# Patient Record
Sex: FEMALE | Race: WHITE | NOT HISPANIC OR LATINO | Employment: OTHER | URBAN - METROPOLITAN AREA
[De-identification: names, ages, dates, MRNs, and addresses within clinical notes are randomized per-mention and may not be internally consistent; named-entity substitution may affect disease eponyms.]

---

## 2017-05-22 ENCOUNTER — HOSPITAL ENCOUNTER (OUTPATIENT)
Dept: PHYSICAL THERAPY | Facility: HOSPITAL | Age: 62
Setting detail: RECURRING SERIES
Discharge: HOME | End: 2017-07-25
Attending: PAIN MEDICINE | Admitting: PAIN MEDICINE

## 2017-12-08 ENCOUNTER — HOSPITAL ENCOUNTER (OUTPATIENT)
Dept: MAMMOGRAPHY | Facility: HOSPITAL | Age: 62
Discharge: HOME | End: 2017-12-08
Attending: SURGERY | Admitting: SURGERY

## 2018-01-03 ENCOUNTER — OFFICE (AMBULATORY)
Dept: URBAN - METROPOLITAN AREA CLINIC 64 | Facility: CLINIC | Age: 63
End: 2018-01-03

## 2018-01-03 VITALS
WEIGHT: 164 LBS | HEART RATE: 74 BPM | DIASTOLIC BLOOD PRESSURE: 82 MMHG | SYSTOLIC BLOOD PRESSURE: 129 MMHG | HEIGHT: 66 IN

## 2018-01-03 DIAGNOSIS — K21.9 GASTRO-ESOPHAGEAL REFLUX DISEASE WITHOUT ESOPHAGITIS: ICD-10-CM

## 2018-01-03 PROCEDURE — 99212 OFFICE O/P EST SF 10 MIN: CPT | Performed by: INTERNAL MEDICINE

## 2018-01-03 RX ORDER — DEXLANSOPRAZOLE 60 MG/1
60 CAPSULE, DELAYED RELEASE ORAL
Qty: 90 | Refills: 3 | Status: COMPLETED
Start: 2017-11-06 | End: 2019-07-26

## 2018-05-02 ENCOUNTER — HOSPITAL ENCOUNTER (OUTPATIENT)
Dept: PHYSICAL THERAPY | Facility: HOSPITAL | Age: 63
Setting detail: RECURRING SERIES
Discharge: HOME | End: 2018-07-02
Attending: PAIN MEDICINE | Admitting: PAIN MEDICINE

## 2018-06-27 ENCOUNTER — CONVERSION ENCOUNTER (OUTPATIENT)
Dept: ORTHOPEDIC SURGERY | Facility: CLINIC | Age: 63
End: 2018-06-27

## 2018-08-01 ENCOUNTER — CONVERSION ENCOUNTER (OUTPATIENT)
Dept: ORTHOPEDIC SURGERY | Facility: CLINIC | Age: 63
End: 2018-08-01

## 2018-08-31 ENCOUNTER — HOSPITAL ENCOUNTER (OUTPATIENT)
Dept: FAMILY MEDICINE CLINIC | Facility: CLINIC | Age: 63
Setting detail: SPECIMEN
Discharge: HOME | End: 2018-08-31
Attending: FAMILY MEDICINE | Admitting: FAMILY MEDICINE

## 2018-08-31 ENCOUNTER — CONVERSION ENCOUNTER (OUTPATIENT)
Dept: ORTHOPEDIC SURGERY | Facility: CLINIC | Age: 63
End: 2018-08-31

## 2018-08-31 LAB
ALBUMIN SERPL-MCNC: 4.4 G/DL (ref 3.5–4.8)
ALBUMIN/GLOB SERPL: 1.5 {RATIO} (ref 1–1.7)
ALP SERPL-CCNC: 53 IU/L (ref 32–91)
ALT SERPL-CCNC: 23 IU/L (ref 14–54)
ANION GAP SERPL CALC-SCNC: 12.6 MMOL/L (ref 10–20)
AST SERPL-CCNC: 27 IU/L (ref 15–41)
BILIRUB SERPL-MCNC: 0.9 MG/DL (ref 0.3–1.2)
BUN SERPL-MCNC: 16 MG/DL (ref 8–20)
BUN/CREAT SERPL: 22.9 (ref 5.4–26.2)
CALCIUM SERPL-MCNC: 9.3 MG/DL (ref 8.9–10.3)
CHLORIDE SERPL-SCNC: 103 MMOL/L (ref 101–111)
CHOLEST SERPL-MCNC: 183 MG/DL
CHOLEST/HDLC SERPL: 3.2 {RATIO}
CONV CO2: 25 MMOL/L (ref 22–32)
CONV LDL CHOLESTEROL DIRECT: 113 MG/DL (ref 0–100)
CONV TOTAL PROTEIN: 7.3 G/DL (ref 6.1–7.9)
CREAT UR-MCNC: 0.7 MG/DL (ref 0.4–1)
GLOBULIN UR ELPH-MCNC: 2.9 G/DL (ref 2.5–3.8)
GLUCOSE SERPL-MCNC: 75 MG/DL (ref 65–99)
HDLC SERPL-MCNC: 58 MG/DL
LDLC/HDLC SERPL: 2 {RATIO}
LIPID INTERPRETATION: ABNORMAL
POTASSIUM SERPL-SCNC: 3.6 MMOL/L (ref 3.6–5.1)
SODIUM SERPL-SCNC: 137 MMOL/L (ref 136–144)
TRIGL SERPL-MCNC: 57 MG/DL
VLDLC SERPL CALC-MCNC: 11.9 MG/DL

## 2018-09-14 ENCOUNTER — HOSPITAL ENCOUNTER (OUTPATIENT)
Dept: GENERAL RADIOLOGY | Facility: HOSPITAL | Age: 63
Discharge: HOME | End: 2018-09-14
Attending: PAIN MEDICINE | Admitting: PAIN MEDICINE

## 2019-01-09 ENCOUNTER — CONVERSION ENCOUNTER (OUTPATIENT)
Dept: ORTHOPEDIC SURGERY | Facility: CLINIC | Age: 64
End: 2019-01-09

## 2019-01-09 ENCOUNTER — OFFICE (AMBULATORY)
Dept: URBAN - METROPOLITAN AREA CLINIC 64 | Facility: CLINIC | Age: 64
End: 2019-01-09

## 2019-01-09 VITALS
HEART RATE: 69 BPM | HEIGHT: 66 IN | SYSTOLIC BLOOD PRESSURE: 139 MMHG | DIASTOLIC BLOOD PRESSURE: 98 MMHG | WEIGHT: 168 LBS

## 2019-01-09 DIAGNOSIS — K21.9 GASTRO-ESOPHAGEAL REFLUX DISEASE WITHOUT ESOPHAGITIS: ICD-10-CM

## 2019-01-09 PROCEDURE — 99213 OFFICE O/P EST LOW 20 MIN: CPT | Performed by: INTERNAL MEDICINE

## 2019-01-09 RX ORDER — DEXLANSOPRAZOLE 60 MG/1
CAPSULE, DELAYED RELEASE ORAL
Qty: 90 | Refills: 3 | Status: ACTIVE

## 2019-01-18 ENCOUNTER — HOSPITAL ENCOUNTER (OUTPATIENT)
Dept: MRI IMAGING | Facility: HOSPITAL | Age: 64
Discharge: HOME | End: 2019-01-18
Attending: PHYSICIAN ASSISTANT | Admitting: PHYSICIAN ASSISTANT

## 2019-01-25 ENCOUNTER — CONVERSION ENCOUNTER (OUTPATIENT)
Dept: ORTHOPEDIC SURGERY | Facility: CLINIC | Age: 64
End: 2019-01-25

## 2019-02-04 ENCOUNTER — HOSPITAL ENCOUNTER (OUTPATIENT)
Dept: PREADMISSION TESTING | Facility: HOSPITAL | Age: 64
Discharge: HOME | End: 2019-02-04
Attending: ORTHOPAEDIC SURGERY | Admitting: ORTHOPAEDIC SURGERY

## 2019-02-04 LAB
ABO + RH BLD: NORMAL
ANION GAP SERPL CALC-SCNC: 14.8 MMOL/L (ref 10–20)
ARMBAND: NORMAL
BACTERIA SPEC AEROBE CULT: NORMAL
BASOPHILS # BLD AUTO: 0 10*3/UL (ref 0–0.2)
BASOPHILS NFR BLD AUTO: 0 % (ref 0–2)
BILIRUB UR QL STRIP: NEGATIVE MG/DL
BLD COMPONENT TYPE: NORMAL
BLD GP AB SCN SERPL QL: NEGATIVE
BUN SERPL-MCNC: 20 MG/DL (ref 8–20)
BUN/CREAT SERPL: 25 (ref 5.4–26.2)
CALCIUM SERPL-MCNC: 9.2 MG/DL (ref 8.9–10.3)
CASTS URNS QL MICRO: NORMAL /[LPF]
CHLORIDE SERPL-SCNC: 102 MMOL/L (ref 101–111)
COLOR UR: YELLOW
CONV BACTERIA IN URINE MICRO: NEGATIVE
CONV CLARITY OF URINE: CLEAR
CONV CO2: 24 MMOL/L (ref 22–32)
CONV HYALINE CASTS IN URINE MICRO: 0 /[LPF] (ref 0–5)
CONV PROTEIN IN URINE BY AUTOMATED TEST STRIP: NEGATIVE MG/DL
CONV SMALL ROUND CELLS: NORMAL /[HPF]
CONV UROBILINOGEN IN URINE BY AUTOMATED TEST STRIP: 0.2 MG/DL
CREAT UR-MCNC: 0.8 MG/DL (ref 0.4–1)
CROSSMATCH EXPIRATION: NORMAL
CULTURE INDICATED?: NORMAL
DIFFERENTIAL METHOD BLD: (no result)
EOSINOPHIL # BLD AUTO: 0.1 10*3/UL (ref 0–0.3)
EOSINOPHIL # BLD AUTO: 1 % (ref 0–3)
ERYTHROCYTE [DISTWIDTH] IN BLOOD BY AUTOMATED COUNT: 13 % (ref 11.5–14.5)
GLUCOSE SERPL-MCNC: 73 MG/DL (ref 65–99)
GLUCOSE UR QL: NEGATIVE MG/DL
HCT VFR BLD AUTO: 40.9 % (ref 35–49)
HGB BLD-MCNC: 14 G/DL (ref 12–15)
HGB UR QL STRIP: NEGATIVE
KETONES UR QL STRIP: NEGATIVE MG/DL
LEUKOCYTE ESTERASE UR QL STRIP: NEGATIVE
LYMPHOCYTES # BLD AUTO: 4 10*3/UL (ref 0.8–4.8)
LYMPHOCYTES NFR BLD AUTO: 38 % (ref 18–42)
Lab: NORMAL
MCH RBC QN AUTO: 31.9 PG (ref 26–32)
MCHC RBC AUTO-ENTMCNC: 34.2 G/DL (ref 32–36)
MCV RBC AUTO: 93.2 FL (ref 80–94)
MICRO REPORT STATUS: NORMAL
MONOCYTES # BLD AUTO: 0.7 10*3/UL (ref 0.1–1.3)
MONOCYTES NFR BLD AUTO: 6 % (ref 2–11)
NEUTROPHILS # BLD AUTO: 5.7 10*3/UL (ref 2.3–8.6)
NEUTROPHILS NFR BLD AUTO: 55 % (ref 50–75)
NITRITE UR QL STRIP: NEGATIVE
NRBC BLD AUTO-RTO: 0 /100{WBCS}
NRBC/RBC NFR BLD MANUAL: 0 10*3/UL
PH UR STRIP.AUTO: 6 [PH] (ref 4.5–8)
PLATELET # BLD AUTO: 203 10*3/UL (ref 150–450)
PMV BLD AUTO: 9.9 FL (ref 7.4–10.4)
POTASSIUM SERPL-SCNC: 3.8 MMOL/L (ref 3.6–5.1)
RBC # BLD AUTO: 4.39 10*6/UL (ref 4–5.4)
RBC #/AREA URNS HPF: 2 /[HPF] (ref 0–3)
SODIUM SERPL-SCNC: 137 MMOL/L (ref 136–144)
SP GR UR: 1.02 (ref 1–1.03)
SPECIMEN SOURCE: NORMAL
SPERM URNS QL MICRO: NORMAL /[HPF]
SQUAMOUS SPT QL MICRO: 1 /[HPF] (ref 0–5)
UNIDENT CRYS URNS QL MICRO: NORMAL /[HPF]
WBC # BLD AUTO: 10.5 10*3/UL (ref 4.5–11.5)
WBC #/AREA URNS HPF: 1 /[HPF] (ref 0–5)
YEAST SPEC QL WET PREP: NORMAL /[HPF]

## 2019-02-11 ENCOUNTER — HOSPITAL ENCOUNTER (OUTPATIENT)
Dept: PERIOP | Facility: HOSPITAL | Age: 64
Setting detail: HOSPITAL OUTPATIENT SURGERY
Discharge: HOME | End: 2019-02-11
Attending: ORTHOPAEDIC SURGERY | Admitting: ORTHOPAEDIC SURGERY

## 2019-03-01 ENCOUNTER — CONVERSION ENCOUNTER (OUTPATIENT)
Dept: ORTHOPEDIC SURGERY | Facility: CLINIC | Age: 64
End: 2019-03-01

## 2019-03-19 ENCOUNTER — CONVERSION ENCOUNTER (OUTPATIENT)
Dept: ORTHOPEDIC SURGERY | Facility: CLINIC | Age: 64
End: 2019-03-19

## 2019-03-19 ENCOUNTER — HOSPITAL ENCOUNTER (OUTPATIENT)
Dept: ORTHOPEDIC SURGERY | Facility: CLINIC | Age: 64
Discharge: HOME | End: 2019-03-19
Attending: PHYSICIAN ASSISTANT | Admitting: PHYSICIAN ASSISTANT

## 2019-06-04 VITALS
HEART RATE: 67 BPM | SYSTOLIC BLOOD PRESSURE: 134 MMHG | HEART RATE: 78 BPM | HEIGHT: 66 IN | HEIGHT: 66 IN | WEIGHT: 169 LBS | BODY MASS INDEX: 27.16 KG/M2 | BODY MASS INDEX: 27.1 KG/M2 | HEART RATE: 69 BPM | SYSTOLIC BLOOD PRESSURE: 160 MMHG | SYSTOLIC BLOOD PRESSURE: 134 MMHG | SYSTOLIC BLOOD PRESSURE: 149 MMHG | BODY MASS INDEX: 27.64 KG/M2 | OXYGEN SATURATION: 98 % | SYSTOLIC BLOOD PRESSURE: 145 MMHG | DIASTOLIC BLOOD PRESSURE: 90 MMHG | SYSTOLIC BLOOD PRESSURE: 144 MMHG | DIASTOLIC BLOOD PRESSURE: 89 MMHG | SYSTOLIC BLOOD PRESSURE: 131 MMHG | DIASTOLIC BLOOD PRESSURE: 82 MMHG | WEIGHT: 172 LBS | BODY MASS INDEX: 27.16 KG/M2 | WEIGHT: 169 LBS | BODY MASS INDEX: 27.28 KG/M2 | WEIGHT: 168 LBS | WEIGHT: 170 LBS | OXYGEN SATURATION: 99 % | HEART RATE: 64 BPM | WEIGHT: 169 LBS | HEIGHT: 66 IN | HEART RATE: 54 BPM | HEIGHT: 66 IN | HEART RATE: 62 BPM | BODY MASS INDEX: 27.44 KG/M2 | DIASTOLIC BLOOD PRESSURE: 80 MMHG | DIASTOLIC BLOOD PRESSURE: 89 MMHG | BODY MASS INDEX: 27 KG/M2 | WEIGHT: 168.6 LBS | HEIGHT: 66 IN | DIASTOLIC BLOOD PRESSURE: 74 MMHG | HEART RATE: 70 BPM | DIASTOLIC BLOOD PRESSURE: 93 MMHG

## 2019-06-11 ENCOUNTER — HOSPITAL ENCOUNTER (OUTPATIENT)
Dept: ORTHOPEDIC SURGERY | Facility: CLINIC | Age: 64
Discharge: HOME | End: 2019-06-11
Attending: PHYSICIAN ASSISTANT | Admitting: PHYSICIAN ASSISTANT

## 2019-07-13 ENCOUNTER — HOSPITAL ENCOUNTER (OUTPATIENT)
Facility: HOSPITAL | Age: 64
Setting detail: OBSERVATION
Discharge: HOME | End: 2019-07-13
Attending: EMERGENCY MEDICINE | Admitting: INTERNAL MEDICINE

## 2019-07-13 ENCOUNTER — APPOINTMENT (OUTPATIENT)
Dept: NUCLEAR MEDICINE | Facility: HOSPITAL | Age: 64
End: 2019-07-13

## 2019-07-13 ENCOUNTER — APPOINTMENT (OUTPATIENT)
Dept: GENERAL RADIOLOGY | Facility: HOSPITAL | Age: 64
End: 2019-07-13

## 2019-07-13 VITALS
WEIGHT: 167.11 LBS | SYSTOLIC BLOOD PRESSURE: 113 MMHG | TEMPERATURE: 97.4 F | HEART RATE: 51 BPM | HEIGHT: 66 IN | BODY MASS INDEX: 26.86 KG/M2 | OXYGEN SATURATION: 97 % | RESPIRATION RATE: 14 BRPM | DIASTOLIC BLOOD PRESSURE: 68 MMHG

## 2019-07-13 DIAGNOSIS — R07.9 CHEST PAIN, UNSPECIFIED TYPE: Primary | ICD-10-CM

## 2019-07-13 LAB
ALBUMIN SERPL-MCNC: 4.1 G/DL (ref 3.5–4.8)
ALBUMIN/GLOB SERPL: 1.5 G/DL (ref 1–1.7)
ALP SERPL-CCNC: 58 U/L (ref 32–91)
ALT SERPL W P-5'-P-CCNC: 16 U/L (ref 14–54)
ANION GAP SERPL CALCULATED.3IONS-SCNC: 13.5 MMOL/L (ref 5–15)
APTT PPP: 28.1 SECONDS (ref 24–31)
AST SERPL-CCNC: 21 U/L (ref 15–41)
BASOPHILS # BLD AUTO: 0.1 10*3/MM3 (ref 0–0.2)
BASOPHILS NFR BLD AUTO: 1.1 % (ref 0–1.5)
BH CV NUCLEAR PRIOR STUDY: 3
BH CV STRESS BP STAGE 1: NORMAL
BH CV STRESS BP STAGE 2: NORMAL
BH CV STRESS BP STAGE 3: NORMAL
BH CV STRESS DURATION MIN STAGE 1: 3
BH CV STRESS DURATION MIN STAGE 2: 3
BH CV STRESS DURATION MIN STAGE 3: 1
BH CV STRESS DURATION SEC STAGE 1: 0
BH CV STRESS DURATION SEC STAGE 2: 0
BH CV STRESS DURATION SEC STAGE 3: 11
BH CV STRESS GRADE STAGE 1: 10
BH CV STRESS GRADE STAGE 2: 12
BH CV STRESS GRADE STAGE 3: 14
BH CV STRESS HR STAGE 1: 82
BH CV STRESS HR STAGE 2: 127
BH CV STRESS HR STAGE 3: 139
BH CV STRESS METS STAGE 1: 5
BH CV STRESS METS STAGE 2: 7.5
BH CV STRESS METS STAGE 3: 10
BH CV STRESS PROTOCOL 1: NORMAL
BH CV STRESS RECOVERY BP: NORMAL MMHG
BH CV STRESS RECOVERY HR: 69 BPM
BH CV STRESS SPEED STAGE 1: 1.7
BH CV STRESS SPEED STAGE 2: 2.5
BH CV STRESS SPEED STAGE 3: 3.4
BH CV STRESS STAGE 1: 1
BH CV STRESS STAGE 2: 2
BH CV STRESS STAGE 3: 3
BILIRUB SERPL-MCNC: 0.5 MG/DL (ref 0.3–1.2)
BUN BLD-MCNC: 22 MG/DL (ref 8–20)
BUN/CREAT SERPL: 36.7 (ref 5.4–26.2)
CALCIUM SPEC-SCNC: 9.2 MG/DL (ref 8.9–10.3)
CHLORIDE SERPL-SCNC: 110 MMOL/L (ref 101–111)
CO2 SERPL-SCNC: 21 MMOL/L (ref 22–32)
CREAT BLD-MCNC: 0.6 MG/DL (ref 0.4–1)
D DIMER PPP FEU-MCNC: 0.27 MCGFEU/ML (ref 0.17–0.59)
DEPRECATED RDW RBC AUTO: 41.6 FL (ref 37–54)
EOSINOPHIL # BLD AUTO: 0.2 10*3/MM3 (ref 0–0.4)
EOSINOPHIL NFR BLD AUTO: 3.1 % (ref 0.3–6.2)
ERYTHROCYTE [DISTWIDTH] IN BLOOD BY AUTOMATED COUNT: 12.9 % (ref 12.3–15.4)
GFR SERPL CREATININE-BSD FRML MDRD: 101 ML/MIN/1.73
GLOBULIN UR ELPH-MCNC: 2.7 GM/DL (ref 2.5–3.8)
GLUCOSE BLD-MCNC: 99 MG/DL (ref 65–99)
HCT VFR BLD AUTO: 39.7 % (ref 34–46.6)
HGB BLD-MCNC: 13.8 G/DL (ref 12–15.9)
HOLD SPECIMEN: NORMAL
HOLD SPECIMEN: NORMAL
INR PPP: 0.98 (ref 0.9–1.1)
LYMPHOCYTES # BLD AUTO: 3.6 10*3/MM3 (ref 0.7–3.1)
LYMPHOCYTES NFR BLD AUTO: 44.4 % (ref 19.6–45.3)
MAXIMAL PREDICTED HEART RATE: 158 BPM
MCH RBC QN AUTO: 31.7 PG (ref 26.6–33)
MCHC RBC AUTO-ENTMCNC: 34.8 G/DL (ref 31.5–35.7)
MCV RBC AUTO: 91.1 FL (ref 79–97)
MONOCYTES # BLD AUTO: 0.5 10*3/MM3 (ref 0.1–0.9)
MONOCYTES NFR BLD AUTO: 6.2 % (ref 5–12)
NEUTROPHILS # BLD AUTO: 3.6 10*3/MM3 (ref 1.7–7)
NEUTROPHILS NFR BLD AUTO: 45.2 % (ref 42.7–76)
NRBC BLD AUTO-RTO: 0.1 /100 WBC (ref 0–0.2)
PERCENT MAX PREDICTED HR: 87.97 %
PLATELET # BLD AUTO: 200 10*3/MM3 (ref 140–450)
PMV BLD AUTO: 10 FL (ref 6–12)
POTASSIUM BLD-SCNC: 3.5 MMOL/L (ref 3.6–5.1)
PROT SERPL-MCNC: 6.8 G/DL (ref 6.1–7.9)
PROTHROMBIN TIME: 10.1 SECONDS (ref 9.6–11.7)
RBC # BLD AUTO: 4.35 10*6/MM3 (ref 3.77–5.28)
SODIUM BLD-SCNC: 141 MMOL/L (ref 136–144)
STRESS BASELINE BP: NORMAL MMHG
STRESS BASELINE HR: 69 BPM
STRESS PERCENT HR: 103 %
STRESS POST PEAK BP: NORMAL MMHG
STRESS POST PEAK HR: 139 BPM
STRESS TARGET HR: 134 BPM
TROPONIN I SERPL-MCNC: <0.03 NG/ML (ref 0–0.03)
WBC NRBC COR # BLD: 8 10*3/MM3 (ref 3.4–10.8)
WHOLE BLOOD HOLD SPECIMEN: NORMAL
WHOLE BLOOD HOLD SPECIMEN: NORMAL

## 2019-07-13 PROCEDURE — 0 TECHNETIUM SESTAMIBI: Performed by: INTERNAL MEDICINE

## 2019-07-13 PROCEDURE — 85379 FIBRIN DEGRADATION QUANT: CPT | Performed by: EMERGENCY MEDICINE

## 2019-07-13 PROCEDURE — A9500 TC99M SESTAMIBI: HCPCS | Performed by: INTERNAL MEDICINE

## 2019-07-13 PROCEDURE — 78452 HT MUSCLE IMAGE SPECT MULT: CPT | Performed by: INTERNAL MEDICINE

## 2019-07-13 PROCEDURE — G0378 HOSPITAL OBSERVATION PER HR: HCPCS

## 2019-07-13 PROCEDURE — 96375 TX/PRO/DX INJ NEW DRUG ADDON: CPT

## 2019-07-13 PROCEDURE — 99285 EMERGENCY DEPT VISIT HI MDM: CPT

## 2019-07-13 PROCEDURE — 85025 COMPLETE CBC W/AUTO DIFF WBC: CPT | Performed by: EMERGENCY MEDICINE

## 2019-07-13 PROCEDURE — 96374 THER/PROPH/DIAG INJ IV PUSH: CPT

## 2019-07-13 PROCEDURE — 93018 CV STRESS TEST I&R ONLY: CPT | Performed by: INTERNAL MEDICINE

## 2019-07-13 PROCEDURE — 85730 THROMBOPLASTIN TIME PARTIAL: CPT | Performed by: EMERGENCY MEDICINE

## 2019-07-13 PROCEDURE — 80053 COMPREHEN METABOLIC PANEL: CPT | Performed by: EMERGENCY MEDICINE

## 2019-07-13 PROCEDURE — 25010000002 ONDANSETRON PER 1 MG: Performed by: EMERGENCY MEDICINE

## 2019-07-13 PROCEDURE — 78452 HT MUSCLE IMAGE SPECT MULT: CPT

## 2019-07-13 PROCEDURE — 84484 ASSAY OF TROPONIN QUANT: CPT | Performed by: NURSE PRACTITIONER

## 2019-07-13 PROCEDURE — 85610 PROTHROMBIN TIME: CPT | Performed by: EMERGENCY MEDICINE

## 2019-07-13 PROCEDURE — 84484 ASSAY OF TROPONIN QUANT: CPT | Performed by: EMERGENCY MEDICINE

## 2019-07-13 PROCEDURE — 93005 ELECTROCARDIOGRAM TRACING: CPT | Performed by: EMERGENCY MEDICINE

## 2019-07-13 PROCEDURE — 71045 X-RAY EXAM CHEST 1 VIEW: CPT

## 2019-07-13 PROCEDURE — 93017 CV STRESS TEST TRACING ONLY: CPT

## 2019-07-13 PROCEDURE — 99236 HOSP IP/OBS SAME DATE HI 85: CPT | Performed by: INTERNAL MEDICINE

## 2019-07-13 RX ORDER — HYDROCODONE BITARTRATE AND ACETAMINOPHEN 7.5; 325 MG/1; MG/1
1 TABLET ORAL 3 TIMES DAILY
Status: DISCONTINUED | OUTPATIENT
Start: 2019-07-13 | End: 2019-07-13 | Stop reason: HOSPADM

## 2019-07-13 RX ORDER — FAMOTIDINE 20 MG/1
20 TABLET, FILM COATED ORAL DAILY
Status: DISCONTINUED | OUTPATIENT
Start: 2019-07-13 | End: 2019-07-13 | Stop reason: HOSPADM

## 2019-07-13 RX ORDER — ATORVASTATIN CALCIUM 10 MG/1
10 TABLET, FILM COATED ORAL DAILY
COMMUNITY
End: 2019-09-05 | Stop reason: DRUGHIGH

## 2019-07-13 RX ORDER — FAMOTIDINE 20 MG/1
40 TABLET, FILM COATED ORAL 2 TIMES DAILY
COMMUNITY
End: 2020-02-11

## 2019-07-13 RX ORDER — GABAPENTIN 100 MG/1
100 CAPSULE ORAL NIGHTLY
COMMUNITY

## 2019-07-13 RX ORDER — CELECOXIB 200 MG/1
200 CAPSULE ORAL EVERY EVENING
Status: DISCONTINUED | OUTPATIENT
Start: 2019-07-13 | End: 2019-07-13 | Stop reason: HOSPADM

## 2019-07-13 RX ORDER — FAMOTIDINE 20 MG/1
20 TABLET, FILM COATED ORAL 2 TIMES DAILY
COMMUNITY
End: 2019-07-13

## 2019-07-13 RX ORDER — SODIUM CHLORIDE 0.9 % (FLUSH) 0.9 %
3-10 SYRINGE (ML) INJECTION AS NEEDED
Status: DISCONTINUED | OUTPATIENT
Start: 2019-07-13 | End: 2019-07-13 | Stop reason: HOSPADM

## 2019-07-13 RX ORDER — SODIUM CHLORIDE 0.9 % (FLUSH) 0.9 %
10 SYRINGE (ML) INJECTION AS NEEDED
Status: DISCONTINUED | OUTPATIENT
Start: 2019-07-13 | End: 2019-07-13 | Stop reason: HOSPADM

## 2019-07-13 RX ORDER — PANTOPRAZOLE SODIUM 40 MG/10ML
40 INJECTION, POWDER, LYOPHILIZED, FOR SOLUTION INTRAVENOUS ONCE
Status: COMPLETED | OUTPATIENT
Start: 2019-07-13 | End: 2019-07-13

## 2019-07-13 RX ORDER — ACETAMINOPHEN 325 MG/1
650 TABLET ORAL EVERY 4 HOURS PRN
Status: DISCONTINUED | OUTPATIENT
Start: 2019-07-13 | End: 2019-07-13 | Stop reason: HOSPADM

## 2019-07-13 RX ORDER — ONDANSETRON 2 MG/ML
4 INJECTION INTRAMUSCULAR; INTRAVENOUS EVERY 6 HOURS PRN
Status: DISCONTINUED | OUTPATIENT
Start: 2019-07-13 | End: 2019-07-13 | Stop reason: HOSPADM

## 2019-07-13 RX ORDER — SUCRALFATE 1 G/1
1 TABLET ORAL
Status: DISCONTINUED | OUTPATIENT
Start: 2019-07-13 | End: 2019-07-13 | Stop reason: HOSPADM

## 2019-07-13 RX ORDER — SODIUM CHLORIDE 0.9 % (FLUSH) 0.9 %
3 SYRINGE (ML) INJECTION EVERY 12 HOURS SCHEDULED
Status: DISCONTINUED | OUTPATIENT
Start: 2019-07-13 | End: 2019-07-13 | Stop reason: HOSPADM

## 2019-07-13 RX ORDER — CHLORAL HYDRATE 500 MG
1200 CAPSULE ORAL
COMMUNITY

## 2019-07-13 RX ORDER — ONDANSETRON 2 MG/ML
4 INJECTION INTRAMUSCULAR; INTRAVENOUS ONCE
Status: COMPLETED | OUTPATIENT
Start: 2019-07-13 | End: 2019-07-13

## 2019-07-13 RX ORDER — HYDROCODONE BITARTRATE AND ACETAMINOPHEN 7.5; 325 MG/1; MG/1
1 TABLET ORAL 3 TIMES DAILY
COMMUNITY

## 2019-07-13 RX ORDER — PANTOPRAZOLE SODIUM 40 MG/1
40 TABLET, DELAYED RELEASE ORAL
Status: DISCONTINUED | OUTPATIENT
Start: 2019-07-13 | End: 2019-07-13 | Stop reason: HOSPADM

## 2019-07-13 RX ORDER — DEXLANSOPRAZOLE 60 MG/1
60 CAPSULE, DELAYED RELEASE ORAL DAILY
COMMUNITY

## 2019-07-13 RX ORDER — NITROGLYCERIN 0.4 MG/1
0.4 TABLET SUBLINGUAL
Status: DISCONTINUED | OUTPATIENT
Start: 2019-07-13 | End: 2019-07-13 | Stop reason: HOSPADM

## 2019-07-13 RX ORDER — ASPIRIN 81 MG/1
324 TABLET, CHEWABLE ORAL ONCE
Status: COMPLETED | OUTPATIENT
Start: 2019-07-13 | End: 2019-07-13

## 2019-07-13 RX ORDER — GABAPENTIN 100 MG/1
100 CAPSULE ORAL NIGHTLY
Status: DISCONTINUED | OUTPATIENT
Start: 2019-07-13 | End: 2019-07-13 | Stop reason: HOSPADM

## 2019-07-13 RX ORDER — ATORVASTATIN CALCIUM 10 MG/1
10 TABLET, FILM COATED ORAL DAILY
Status: DISCONTINUED | OUTPATIENT
Start: 2019-07-13 | End: 2019-07-13 | Stop reason: HOSPADM

## 2019-07-13 RX ORDER — CELECOXIB 200 MG/1
200 CAPSULE ORAL EVERY EVENING
COMMUNITY
End: 2021-02-26

## 2019-07-13 RX ORDER — SUCRALFATE 1 G/1
1 TABLET ORAL
Qty: 120 TABLET | Refills: 0 | Status: SHIPPED | OUTPATIENT
Start: 2019-07-13 | End: 2019-08-12

## 2019-07-13 RX ADMIN — Medication 3 ML: at 09:10

## 2019-07-13 RX ADMIN — PANTOPRAZOLE SODIUM 40 MG: 40 TABLET, DELAYED RELEASE ORAL at 06:30

## 2019-07-13 RX ADMIN — SUCRALFATE 1 G: 1 TABLET ORAL at 11:27

## 2019-07-13 RX ADMIN — PANTOPRAZOLE SODIUM 40 MG: 40 INJECTION, POWDER, FOR SOLUTION INTRAVENOUS at 02:07

## 2019-07-13 RX ADMIN — HYDROCODONE BITARTRATE AND ACETAMINOPHEN 1 TABLET: 7.5; 325 TABLET ORAL at 15:57

## 2019-07-13 RX ADMIN — ASPIRIN 81 MG 324 MG: 81 TABLET ORAL at 02:09

## 2019-07-13 RX ADMIN — ONDANSETRON 4 MG: 2 INJECTION INTRAMUSCULAR; INTRAVENOUS at 02:08

## 2019-07-13 RX ADMIN — HYDROCODONE BITARTRATE AND ACETAMINOPHEN 1 TABLET: 7.5; 325 TABLET ORAL at 09:11

## 2019-07-13 RX ADMIN — TECHNETIUM TC 99M SESTAMIBI 1 DOSE: 1 INJECTION INTRAVENOUS at 08:10

## 2019-07-13 RX ADMIN — FAMOTIDINE 20 MG: 20 TABLET, FILM COATED ORAL at 09:11

## 2019-07-13 RX ADMIN — TECHNETIUM TC 99M SESTAMIBI 1 DOSE: 1 INJECTION INTRAVENOUS at 09:55

## 2019-07-13 NOTE — ED PROVIDER NOTES
Subjective   64-year-old female complains of 2 weeks of intermittent chest pain, no clear aggravating or alleviating factors.  Patient went to bed feeling well but woke up just prior to arrival with severe substernal chest pain associated with nausea and vomiting, diaphoresis, ringing in her ears.  Majority of the symptoms have resolved and chest pain has improved spontaneously.  She refused nitroglycerin per EMS and paramedics she says it gives her a severe headache.  Patient reports a history of remote cardiac ablation and stress testing 4 years ago which was reportedly negative.    Patient denies any history of hypertension hyperlipidemia or diabetes.    Patient reports her father dropdead from a heart attack at 48 however details are unclear as the patient did not have an autopsy to her knowledge.            Review of Systems   Constitutional: Positive for diaphoresis.   HENT: Positive for rhinorrhea, sinus pressure and tinnitus.    Cardiovascular: Positive for chest pain.   Gastrointestinal: Positive for diarrhea and vomiting.   Neurological: Positive for light-headedness and headaches.   All other systems reviewed and are negative.      Past Medical History:   Diagnosis Date   • GERD (gastroesophageal reflux disease)        No Known Allergies    History reviewed. No pertinent surgical history.    History reviewed. No pertinent family history.    Social History     Socioeconomic History   • Marital status:      Spouse name: Not on file   • Number of children: Not on file   • Years of education: Not on file   • Highest education level: Not on file   Tobacco Use   • Smoking status: Current Every Day Smoker   Substance and Sexual Activity   • Alcohol use: No     Frequency: Never   • Drug use: No           Objective   Physical Exam   Constitutional: She is oriented to person, place, and time. She appears well-developed and well-nourished.   HENT:   Head: Normocephalic and atraumatic.   Tympanic membranes  normal bilaterally   Eyes: EOM are normal. Pupils are equal, round, and reactive to light.   Neck: Normal range of motion. Neck supple.   Cardiovascular: Normal rate, regular rhythm, normal heart sounds and intact distal pulses.   Pulmonary/Chest: Effort normal and breath sounds normal.   Abdominal: Soft. Bowel sounds are normal. She exhibits no distension. There is no tenderness.   Musculoskeletal: Normal range of motion. She exhibits no edema or deformity.   Neurological: She is alert and oriented to person, place, and time. No cranial nerve deficit.   Motor and sensation intact   Skin: Skin is warm and dry. Capillary refill takes less than 2 seconds.   Psychiatric: She has a normal mood and affect. Her behavior is normal.       Procedures           ED Course  ED Course as of Jul 13 0358   Sat Jul 13, 2019   0144 EKG interpretation: Sinus bradycardia, rate 58, no acute ST elevation  [JR]      ED Course User Index  [JR] Jerome Dunham MD                  MDM  Number of Diagnoses or Management Options  Chest pain, unspecified type:   Diagnosis management comments: Feels better, VSS, given severity of symptoms initially and risk factors, will admit.    Results for orders placed or performed during the hospital encounter of 07/13/19  -Comprehensive Metabolic Panel       Result                      Value             Ref Range           Glucose                     99                65 - 99 mg/dL       BUN                         22 (H)            8 - 20 mg/dL        Creatinine                  0.60              0.40 - 1.00 *       Sodium                      141               136 - 144 mm*       Potassium                   3.5 (L)           3.6 - 5.1 mm*       Chloride                    110               101 - 111 mm*       CO2                         21.0 (L)          22.0 - 32.0 *       Calcium                     9.2               8.9 - 10.3 m*       Total Protein               6.8               6.1 - 7.9 g/*        Albumin                     4.10              3.50 - 4.80 *       ALT (SGPT)                  16                14 - 54 U/L         AST (SGOT)                  21                15 - 41 U/L         Alkaline Phosphatase        58                32 - 91 U/L         Total Bilirubin             0.5               0.3 - 1.2 mg*       eGFR Non African Amer       101               >60 mL/min/1*       Globulin                    2.7               2.5 - 3.8 gm*       A/G Ratio                   1.5               1.0 - 1.7 g/*       BUN/Creatinine Ratio        36.7 (H)          5.4 - 26.2          Anion Gap                   13.5              5.0 - 15.0 m*  -Protime-INR       Result                      Value             Ref Range           Protime                     10.1              9.6 - 11.7 S*       INR                         0.98              0.90 - 1.10    -aPTT       Result                      Value             Ref Range           PTT                         28.1              24.0 - 31.0 *  -D-dimer, Quantitative       Result                      Value             Ref Range           D-Dimer, Quantitative       0.27              0.17 - 0.59 *  -Troponin       Result                      Value             Ref Range           Troponin I                  <0.030            0.000 - 0.03*  -CBC Auto Differential       Result                      Value             Ref Range           WBC                         8.00              3.40 - 10.80*       RBC                         4.35              3.77 - 5.28 *       Hemoglobin                  13.8              12.0 - 15.9 *       Hematocrit                  39.7              34.0 - 46.6 %       MCV                         91.1              79.0 - 97.0 *       MCH                         31.7              26.6 - 33.0 *       MCHC                        34.8              31.5 - 35.7 *       RDW                         12.9              12.3 - 15.4 %       RDW-SD                       41.6              37.0 - 54.0 *       MPV                         10.0              6.0 - 12.0 fL       Platelets                   200               140 - 450 10*       Neutrophil %                45.2              42.7 - 76.0 %       Lymphocyte %                44.4              19.6 - 45.3 %       Monocyte %                  6.2               5.0 - 12.0 %        Eosinophil %                3.1               0.3 - 6.2 %         Basophil %                  1.1               0.0 - 1.5 %         Neutrophils, Absolute       3.60              1.70 - 7.00 *       Lymphocytes, Absolute       3.60 (H)          0.70 - 3.10 *       Monocytes, Absolute         0.50              0.10 - 0.90 *       Eosinophils, Absolute       0.20              0.00 - 0.40 *       Basophils, Absolute         0.10              0.00 - 0.20 *       nRBC                        0.1               0.0 - 0.2 /1*  -Light Blue Top       Result                      Value             Ref Range           Extra Tube                                                    hold for add-on  -Green Top (Gel)       Result                      Value             Ref Range           Extra Tube                                                    Hold for add-ons.  -Lavender Top       Result                      Value             Ref Range           Extra Tube                                                    hold for add-on  -Gold Top - SST       Result                      Value             Ref Range           Extra Tube                                                    Hold for add-ons.         Amount and/or Complexity of Data Reviewed  Independent visualization of images, tracings, or specimens: yes          Final diagnoses:   Chest pain, unspecified type            Jerome Dunham MD  07/13/19 0358

## 2019-07-13 NOTE — H&P
CC: Chest pain    HPI: Patient is a 65 yo F with medical history of GERD, HLD, and chronic hip pain and neuropathy. She reports that over the last month she has experienced intermittent chest pain that has been increasing in severity. She states that last night, she awoke to shrill ringing in her ears, dizziness, diaphoresis, and nausea with vomiting. Due to a previous ablation the patient was concerned and called an ambulance. In the ER, troponin was normal and other labs were unremarkable. A stress test the next morning was negative, and EKG showed sinus bradycardia. Patient states that she is very active and lives on her own.    Today, the patient feels she is back to baseline and has no complaints other than hip discomfort due to hospital bed. She is eating and drinking, and reports that her chest pain was relieved some with carafate. She still complains of some chest pain with palpation. Otherwise the patient has no additional complaints and denies nausea, vomiting, or headache.    Medical History  Hyperlipidemia  GERD  Arthritis    Surgical History  Cholecystectomy  Appendectomy    Family History  Mother - Cancer  Father - Heart disease  Brother - Cancer    Social History  Current every day smoker (0.25 ppd)  Denies history of alcohol use  Denies history of substance use    Allergies  No known allergies    Hospital Medications  acetaminophen (TYLENOL) tablet 650 mg   aspirin chewable tablet 324 mg (Completed)   atorvastatin (LIPITOR) tablet 10 mg   celecoxib (CeleBREX) capsule 200 mg   famotidine (PEPCID) tablet 20 mg   gabapentin (NEURONTIN) capsule 100 mg   HYDROcodone-acetaminophen (NORCO) 7.5-325 MG per tablet 1 tablet   nitroglycerin (NITROSTAT) SL tablet 0.4 mg   ondansetron (ZOFRAN) injection 4 mg (Completed)   ondansetron (ZOFRAN) injection 4 mg   pantoprazole (PROTONIX) EC tablet 40 mg   pantoprazole (PROTONIX) injection 40 mg (Completed)   sodium chloride 0.9 % flush 10 mL   sodium chloride 0.9 %  flush 3 mL   sodium chloride 0.9 % flush 3-10 mL   sucralfate (CARAFATE) tablet 1 g   technetium sestamibi (CARDIOLITE) injection 1 dose (Completed)   technetium sestamibi (CARDIOLITE) injection 1 dose (Completed)     Outpatient Medications  atorvastatin (LIPITOR) 10 MG tablet   celecoxib (CeleBREX) 200 MG capsule   dexlansoprazole (DEXILANT) 60 MG capsule   diclofenac (FLECTOR) 1.3 % patch patch   famotidine (PEPCID) 20 MG tablet   gabapentin (NEURONTIN) 100 MG capsule   HYDROcodone-acetaminophen (NORCO) 7.5-325 MG per tablet   Omega-3 Fatty Acids (FISH OIL) 1000 MG capsule capsule     Review of Systems  Constitutional: Patient denies weakness, fatigue  Cardiovascular: Patient denies palpitations. Confirms chest pain.  Pulmonary: Patient denies cough, shortness of air, wheeze  Abdominal: Patient denies abdominal pain, nausea, vomiting, diarrhea  Neurological: Patient denies dizziness, headache    Physical Exam  General: Patient is A&O x4, cooperative, in no acute distress  HEENT: EOMI, PERRLA  Cardiovascular: RRR, no murmurs, rubs, gallups  Pulmonary: CTA bilaterally, no wheezes, rales, or rhonchi  Peripheral vascular: No peripheral edema  Skin: Warm, dry, intact. No diaphoresis.    Recent Labs  Troponin   Order: [id number]   Status:  Final result   Visible to patient:  No (Not Released)    Ref Range & Units 12:47   Troponin I 0.000 - 0.030 ng/mL <0.030    Resulting [facility]           D-dimer, Quantitative   Order: [id number]   Status:  Final result   Visible to patient:  No (Not Released)    Ref Range & Units 01:49   D-Dimer, Quantitative 0.17 - 0.59 MCGFEU/mL 0.27    Resulting Agency   [facility]             Comprehensive Metabolic Panel   Order: [id number]   Status:  Final result   Visible to patient:  No (Not Released)    Ref Range & Units 01:49   Glucose 65 - 99 mg/dL 99    BUN 8 - 20 mg/dL 22 Abnormally high     Creatinine 0.40 - 1.00 mg/dL 0.60    Sodium 136 - 144 mmol/L 141    Potassium 3.6 - 5.1  mmol/L 3.5 Abnormally low     Chloride 101 - 111 mmol/L 110    CO2 22.0 - 32.0 mmol/L 21.0 Abnormally low     Calcium 8.9 - 10.3 mg/dL 9.2    Total Protein 6.1 - 7.9 g/dL 6.8    Albumin 3.50 - 4.80 g/dL 4.10    ALT (SGPT) 14 - 54 U/L 16    AST (SGOT) 15 - 41 U/L 21    Alkaline Phosphatase 32 - 91 U/L 58    Total Bilirubin 0.3 - 1.2 mg/dL 0.5    eGFR Non African Amer >60 mL/min/1.73 101    Globulin 2.5 - 3.8 gm/dL 2.7    A/G Ratio 1.0 - 1.7 g/dL 1.5    BUN/Creatinine Ratio 5.4 - 26.2 36.7 Abnormally high     Anion Gap 5.0 - 15.0 mmol/L 13.5            CBC Auto Differential   Order: [id number] - Part of Panel Order [id number]   Status:  Final result   Visible to patient:  No (Not Released)    Ref Range & Units 01:49   WBC 3.40 - 10.80 10*3/mm3 8.00    RBC 3.77 - 5.28 10*6/mm3 4.35    Hemoglobin 12.0 - 15.9 g/dL 13.8    Hematocrit 34.0 - 46.6 % 39.7    MCV 79.0 - 97.0 fL 91.1    MCH 26.6 - 33.0 pg 31.7    MCHC 31.5 - 35.7 g/dL 34.8    RDW 12.3 - 15.4 % 12.9    RDW-SD 37.0 - 54.0 fl 41.6    MPV 6.0 - 12.0 fL 10.0    Platelets 140 - 450 10*3/mm3 200    Neutrophil % 42.7 - 76.0 % 45.2    Lymphocyte % 19.6 - 45.3 % 44.4    Monocyte % 5.0 - 12.0 % 6.2    Eosinophil % 0.3 - 6.2 % 3.1    Basophil % 0.0 - 1.5 % 1.1    Neutrophils, Absolute 1.70 - 7.00 10*3/mm3 3.60    Lymphocytes, Absolute 0.70 - 3.10 10*3/mm3 3.60 Abnormally high     Monocytes, Absolute 0.10 - 0.90 10*3/mm3 0.50    Eosinophils, Absolute 0.00 - 0.40 10*3/mm3 0.20    Basophils, Absolute 0.00 - 0.20 10*3/mm3 0.10    nRBC 0.0 - 0.2 /100 WBC 0.1            Stress Test    Nuclear Study Description A 1-day rest/stress protocol myocardial perfusion imaging study was performed. While at rest, the patient was injected intravenously with 6.8 mCi of technetium sestamibi at 08:10 EDT. While at peak stress, the patient was injected intravenously with 19.1 mCi of technetium sestamibi at 09:55 EDT. The total amount of radiation received in the study is about 7.85 mSv.No prior  studies were available for comparison   Nuclear Perfusion Images Overall image quality is good. Diaphragmatic attenuation and GI artifacts are present. Raw images reviewed with no abnormalities noted.   Imaging Contrast/Medications:      technetium sestamibi (CARDIOLITE) injection 1 dose   Given: 1 dose Intravenous    technetium sestamibi (CARDIOLITE) injection 1 dose   Given: 1 dose Intravenous   Stress Procedure     Rest ECG Baseline ECG of normal sinus rhythm noted. Normal baseline ECG noted at rest.   There was no ST segment deviation noted.   Stress Description A stress test was performed following the Jose Alfredo protocol.   The patient reached the end of the protocol and achieved the target heart rate. The test was stopped because the patient complained of shortness of breath.  The patient reported no symptoms during the stress test. The patient experienced no angina during the stress test.   Low risk for ischemic heart disease.   Blood pressure demonstrated a normal response to stress. Heart rate demonstrated a normal response to stress. Overall, the patient's exercise capacity was normal.   Stress ECG Stress ECG rhythm of sinus tachycardia noted. PVCs noted.   There was no ST segment deviation noted during stress.   Arrhythmias during stress: rare PVCs.   Arrhythmias were not significant.   Stress ECG was interpretable and indicates a normal stress ECG.   Normal ECG stress ECG interpretation.   Recovery ECG During recovery, the patient complained of no significant symptoms following stress.  Sinus rhythm was noted during recovery. Normal ECG with no significant recovery phase changes noted. There was no ST segment deviation noted during recovery.   Arrhythmias during recovery: rare PVCs. No significant arrhythmias were noted during the recovery stage.   Stress Findings No ECG evidence of myocardial ischemia.Negative clinical evidence of myocardial ischemia. Findings consistent with a normal ECG stress test. ECG  NSR without ST T wave Abn seen, stres ECG with J point depression and upsloping ST segments and ST80 was normal. Target met, good predictability, Low risk study for ischemia. .   Nuclear Perfusion Findings     Study Impression Myocardial perfusion imaging indicates a normal myocardial perfusion study with no evidence of ischemia. Impressions are consistent with a low risk study. There is no prior study available for comparison. Normal resting perfusion seen. Stress with small apical defect with normal wall motion. EF 67%, SSS0. SRS0. SDS0. .   Rest Perfusion Defect 1 No rest myocardial perfusion defect noted.   Stress Perfusion Defect 1 There is a small sized defect of mild severity present in the apex wall.   Ventricle Size / Description Left ventricular ejection fraction is normal. Normal LV cavity size. Normal LV wall motion noted. RV cavity size not well visualized        7/13/2019 2:05 AM   XR CHEST 1 VW-     INDICATIONS:   cp     COMPARISON:  2/4/2019     TECHNIQUE:   [Portable chest radiograph]     FINDINGS:   Cardiomediastinal silhouette within normal limits. Mild fullness of the  central pulmonary vasculature without overt pulmonary edema. No  pneumothorax. No large effusion. Osseous structures intact.      IMPRESSION:  Stable chronic findings without acute process.    Assessment and Plan    Chest Pain - patient has long history of smoking which classifies her as high risk for CAD and possible ischemic injury. However, serial troponin remained normal, EKG was unconcerning, and stress test was normal. 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 In addition, the pain was relieved with carafate, which suggests some GI aspect as well. It is likely that this pain is a result of both GERD and muscle injury. Plan to continue carafate and  "consider adjusting PPI dosage. Consider GI consult.    GERD - Patient    Hyperlipidemia - controlled on current atorvastatin dosage    Chronic pain/neuropathy secondary to arthritis - patient has long term narcotic use due to chronic pain. Pain is currently controlled on hydrocodone and gabapentin. Continue current regimen. Consider OT.    Smoker- Patient requires smoking cessation counseling. States \"she isn't addicted and doesn't need patch\".    "

## 2019-07-13 NOTE — DISCHARGE SUMMARY
Date of Admission: 7/13/2019    Date of Discharge:  7/13/2019    Length of stay:  LOS: 0 days     Presenting Problem/History of Present Illness  Active Hospital Problems    Diagnosis  POA   • Chest pain [R07.9]  Yes      Resolved Hospital Problems   No resolved problems to display.          Hospital Course  Ms. Garvin is a 64 year old  female with a past medical history significant for GERD, HLD, chronic hip pain and neuropathy pain with narcotic dependence, and tobacco abuse.  She reports to the ER [facility] with complaints of left sided chest pain with reflux over the past several months.  She states she thought she pulled a muscle because it is sore to the touch, but over the past week her symptoms have increased.  She states that last night after she got up to take the dog out, she sat back down on her bed and had extreme dizziness, nausea and vomiting, and chest pain.  She denies any dyspnea. She reports a normal stress test 4 years ago.  She reports an EGD about 5 years ago and had polyps in there stomach.      Patient was kept in the hospital was ruled out for MI and she clinically improved and discharged home as a stress test came all negative and she will see GI as an outpatient for upper endoscopy.  Patient discharged in stable condition    Discharge diagnosis    Chest pain        Past Medical History:     Past Medical History:   Diagnosis Date   • Arthritis    • GERD (gastroesophageal reflux disease)    • Hyperlipidemia        Past Surgical History:     Past Surgical History:   Procedure Laterality Date   • APPENDECTOMY     • CHOLECYSTECTOMY         Social History:   Social History     Socioeconomic History   • Marital status:      Spouse name: Not on file   • Number of children: Not on file   • Years of education: Not on file   • Highest education level: Not on file   Tobacco Use   • Smoking status: Current Every Day Smoker     Packs/day: 0.25     Types: Cigarettes   Substance and Sexual  Activity   • Alcohol use: No     Frequency: Never   • Drug use: No   • Sexual activity: Defer       Procedures Performed         Consults:   Consults     Date and Time Order Name Status Description    7/13/2019 0355 Inpatient Hospitalist Consult Completed           Pertinent Test Results:     I reviewed the patient's new clinical results.    Results from last 7 days   Lab Units 07/13/19  0149   WBC 10*3/mm3 8.00   HEMOGLOBIN g/dL 13.8   HEMATOCRIT % 39.7   PLATELETS 10*3/mm3 200     Results from last 7 days   Lab Units 07/13/19  0149   SODIUM mmol/L 141   POTASSIUM mmol/L 3.5*   CHLORIDE mmol/L 110   CO2 mmol/L 21.0*   BUN mg/dL 22*   CREATININE mg/dL 0.60   GLUCOSE mg/dL 99   CALCIUM mg/dL 9.2     Results from last 7 days   Lab Units 07/13/19  0149   SODIUM mmol/L 141   POTASSIUM mmol/L 3.5*   CHLORIDE mmol/L 110   CO2 mmol/L 21.0*   BUN mg/dL 22*   CREATININE mg/dL 0.60   CALCIUM mg/dL 9.2   BILIRUBIN mg/dL 0.5   ALK PHOS U/L 58   ALT (SGPT) U/L 16   AST (SGOT) U/L 21   GLUCOSE mg/dL 99         Lab Results   Component Value Date    CALCIUM 9.2 07/13/2019     No results found for: HGBA1C          Microbiology Results (last 10 days)     ** No results found for the last 240 hours. **          ECG/EMG Results (most recent)     Procedure Component Value Units Date/Time    ECG 12 Lead [[id number]] Collected:  07/13/19 0136     Updated:  07/13/19 0138    Narrative:       HEART RATE= 58  bpm  RR Interval= 1036  ms  UT Interval= 170  ms  P Horizontal Axis= 11  deg  P Front Axis= 18  deg  QRSD Interval= 79  ms  QT Interval= 418  ms  QRS Axis= 27  deg  T Wave Axis= 59  deg  - OTHERWISE NORMAL ECG -  Sinus bradycardia  Low voltage, precordial leads  Electronically Signed By:   Date and Time of Study: 2019-07-13 01:36:16                    Xr Chest 1 View    Result Date: 7/13/2019  Stable chronic findings without acute process.  Electronically Signed By-Compa Chow On:7/13/2019 8:35 AM This report was finalized on  [id number] by  Compa Claudine .      Xrays, labs reviewed personally by physician.      Condition on Discharge:    Stable    Vital Signs  Temp:  [97.4 °F (36.3 °C)-97.8 °F (36.6 °C)] 97.4 °F (36.3 °C)  Heart Rate:  [51-61] 51  Resp:  [12-14] 14  BP: (113-149)/(58-89) 113/68    Physical Exam:  Physical Exam    Discharge Diagnosis:     Chest pain      Discharge Disposition  Home or Self Care       Discharge Medications      New Medications      Instructions Start Date   sucralfate 1 g tablet  Commonly known as:  CARAFATE   1 g, Oral, 4 Times Daily Before Meals & Nightly         Continue These Medications      Instructions Start Date   atorvastatin 10 MG tablet  Commonly known as:  LIPITOR   10 mg, Oral, Daily      celecoxib 200 MG capsule  Commonly known as:  CeleBREX   200 mg, Oral, Every Evening      dexlansoprazole 60 MG capsule  Commonly known as:  DEXILANT   60 mg, Oral, Daily      diclofenac 1.3 % patch patch  Commonly known as:  FLECTOR   1 patch, Topical, 2 Times Daily      famotidine 20 MG tablet  Commonly known as:  PEPCID   20 mg, Oral, Daily      fish oil 1000 MG capsule capsule   1,200 mg, Oral, Every Night at Bedtime      gabapentin 100 MG capsule  Commonly known as:  NEURONTIN   100 mg, Oral, Nightly      HYDROcodone-acetaminophen 7.5-325 MG per tablet  Commonly known as:  NORCO   1 tablet, Oral, 3 Times Daily             Discharge Diet:     Activity at Discharge:     Follow-up Appointments  Future Appointments   Date Time Provider Department Center   8/30/2019  1:30 PM Jennifer Jaffe MD [facility] None         Test Results Pending at Discharge       Risk for Readmission (LACE) Score: 1 (7/13/2019  6:00 AM)          Leland Gonzalez MD  07/13/19  6:18 PM    Time: Greater than 30 minutes in arranging for the discharge.

## 2019-07-13 NOTE — H&P
[facility] HOSPITALIST     Jennifer Jaffe MD    CHIEF COMPLAINT:     Heart burn, dizziness, chest pain    HISTORY OF PRESENT ILLNESS:    Ms. Garvin is a 64 year old  female with a past medical history significant for GERD, HLD, chronic hip pain and neuropathy pain with narcotic dependence, and tobacco abuse.  She reports to the ER [facility] with complaints of left sided chest pain with reflux over the past several months.  She states she thought she pulled a muscle because it is sore to the touch, but over the past week her symptoms have increased.  She states that last night after she got up to take the dog out, she sat back down on her bed and had extreme dizziness, nausea and vomiting, and chest pain.  She denies any dyspnea. She reports a normal stress test 4 years ago.  She reports an EGD about 5 years ago and had polyps in there stomach.      Work up int he ER was unremarkable except for potassium of 3.5.  She will be admitted for serial troponin's and a stress test this morning.     Cardiologist: Santosh  GI: Nelly       Past Medical History:   Diagnosis Date   • Arthritis    • GERD (gastroesophageal reflux disease)    • Hyperlipidemia      Past Surgical History:   Procedure Laterality Date   • APPENDECTOMY     • CHOLECYSTECTOMY       Family History   Problem Relation Age of Onset   • Cancer Mother    • Heart disease Father    • Cancer Brother      Social History     Tobacco Use   • Smoking status: Current Every Day Smoker     Packs/day: 0.25     Types: Cigarettes   Substance Use Topics   • Alcohol use: No     Frequency: Never   • Drug use: No     No medications prior to admission.     Allergies:  Patient has no known allergies.      There is no immunization history on file for this patient.        REVIEW OF SYSTEMS:     Review of Systems   Cardiovascular: Positive for chest pain.   Gastrointestinal: Positive for heartburn.   Neurological: Positive for dizziness.   All  "other systems reviewed and are negative.      Vital Signs  Temp:  [97.4 °F (36.3 °C)-97.8 °F (36.6 °C)] 97.4 °F (36.3 °C)  Heart Rate:  [51-61] 51  Resp:  [12-14] 14  BP: (113-149)/(58-89) 113/68    Flowsheet Rows      First Filed Value   Admission Height  167.6 cm (66\") Documented at 07/13/2019 0133   Admission Weight  77.1 kg (170 lb) Documented at 07/13/2019 0133           Physical Exam:    Physical Exam   Constitutional: She is oriented to person, place, and time. She appears well-developed and well-nourished.   HENT:   Head: Normocephalic and atraumatic.   Eyes: EOM are normal. Pupils are equal, round, and reactive to light.   Neck: Normal range of motion.   Cardiovascular: Normal rate and regular rhythm.   Pulmonary/Chest: Effort normal and breath sounds normal.   Abdominal: Soft. Bowel sounds are normal.   Musculoskeletal: Normal range of motion. She exhibits tenderness.   Left chest tenderness   Neurological: She is alert and oriented to person, place, and time.   Skin: Skin is warm and dry.         Results Review:    I reviewed the patient's new clinical results.  Lab Results (most recent)     Procedure Component Value Units Date/Time    [employer] Draw [[id number]] Collected:  07/13/19 0149    Specimen:  Blood Updated:  07/13/19 0300    Narrative:       The following orders were created for panel order [employer] Draw.  Procedure                               Abnormality         Status                     ---------                               -----------         ------                     Light Blue Top[[id number]]                                   Final result               Green Top (Gel)[[id number]]                                  Final result               Lavender Top[[id number]]                                     Final result               Gold Top - SST[[id number]]                                   Final result                 Please view results for these tests on the individual orders.    Light Blue Top " [[id number]] Collected:  07/13/19 0149    Specimen:  Blood Updated:  07/13/19 0300     Extra Tube hold for add-on     Comment: Auto resulted       Green Top (Gel) [[id number]] Collected:  07/13/19 0149    Specimen:  Blood Updated:  07/13/19 0300     Extra Tube Hold for add-ons.     Comment: Auto resulted.       Lavender Top [[id number]] Collected:  07/13/19 0149    Specimen:  Blood Updated:  07/13/19 0300     Extra Tube hold for add-on     Comment: Auto resulted       Gold Top - SST [[id number]] Collected:  07/13/19 0149    Specimen:  Blood Updated:  07/13/19 0300     Extra Tube Hold for add-ons.     Comment: Auto resulted.       Troponin [[id number]]  (Normal) Collected:  07/13/19 0149    Specimen:  Blood Updated:  07/13/19 0242     Troponin I <0.030 ng/mL     Narrative:       Troponin I Reference Range:    0.00-0.03  Negative.  Repeat testing in 4-6 hours if clinically indicated.    0.04-0.29  Suspicious for myocardial injury. Serial measurements and clinical  correlation may be necessary to confirm or exclude diagnosis of acute  coronary syndrome.  Repeat testing in 4-6 hours if indicated.     >0.29 Consistent with myocardial injury.  Recommend clinical and laboratory correlation.     Results my be falsely decreased if patient taking Biotin.     Comprehensive Metabolic Panel [[id number]]  (Abnormal) Collected:  07/13/19 0149    Specimen:  Blood Updated:  07/13/19 0240     Glucose 99 mg/dL      BUN 22 mg/dL      Creatinine 0.60 mg/dL      Sodium 141 mmol/L      Potassium 3.5 mmol/L      Chloride 110 mmol/L      CO2 21.0 mmol/L      Calcium 9.2 mg/dL      Total Protein 6.8 g/dL      Albumin 4.10 g/dL      ALT (SGPT) 16 U/L      AST (SGOT) 21 U/L      Alkaline Phosphatase 58 U/L      Total Bilirubin 0.5 mg/dL      eGFR Non African Amer 101 mL/min/1.73      Globulin 2.7 gm/dL      A/G Ratio 1.5 g/dL      BUN/Creatinine Ratio 36.7     Anion Gap 13.5 mmol/L     Protime-INR [[id number]]  (Normal) Collected:  07/13/19 0149     Specimen:  Blood Updated:  07/13/19 0233     Protime 10.1 Seconds      INR 0.98    aPTT [[id number]]  (Normal) Collected:  07/13/19 0149    Specimen:  Blood Updated:  07/13/19 0233     PTT 28.1 seconds     D-dimer, Quantitative [[id number]]  (Normal) Collected:  07/13/19 0149    Specimen:  Blood Updated:  07/13/19 0233     D-Dimer, Quantitative 0.27 MCGFEU/mL     Narrative:       Reference Range  --------------------------------------------------------------------     < 0.50   Negative Predictive Value  0.50-0.59   Indeterminate    >= 0.60   Probable VTE             A very low percentage of patients with DVT may yield D-Dimer results   below the cut-off of 0.50 MCGFEU/mL.  This is known to be more   prevalent in patients with distal DVT.             Results of this test should always be interpreted in conjunction with   the patient's medical history, clinical presentation and other   findings.  Clinical diagnosis should not be based on the result of   INNOVANCE D-Dimer alone.    CBC & Differential [[id number]] Collected:  07/13/19 0149    Specimen:  Blood Updated:  07/13/19 0218    Narrative:       The following orders were created for panel order CBC & Differential.  Procedure                               Abnormality         Status                     ---------                               -----------         ------                     CBC Auto Differential[[id number]]        Abnormal            Final result                 Please view results for these tests on the individual orders.    CBC Auto Differential [[id number]]  (Abnormal) Collected:  07/13/19 0149    Specimen:  Blood Updated:  07/13/19 0218     WBC 8.00 10*3/mm3      RBC 4.35 10*6/mm3      Hemoglobin 13.8 g/dL      Hematocrit 39.7 %      MCV 91.1 fL      MCH 31.7 pg      MCHC 34.8 g/dL      RDW 12.9 %      RDW-SD 41.6 fl      MPV 10.0 fL      Platelets 200 10*3/mm3      Neutrophil % 45.2 %      Lymphocyte % 44.4 %      Monocyte % 6.2 %      Eosinophil %  3.1 %      Basophil % 1.1 %      Neutrophils, Absolute 3.60 10*3/mm3      Lymphocytes, Absolute 3.60 10*3/mm3      Monocytes, Absolute 0.50 10*3/mm3      Eosinophils, Absolute 0.20 10*3/mm3      Basophils, Absolute 0.10 10*3/mm3      nRBC 0.1 /100 WBC           Imaging Results (most recent)     Procedure Component Value Units Date/Time    XR Chest 1 View [[id number]] Collected:  07/13/19 0835     Updated:  07/13/19 0837    Narrative:          DATE OF EXAM:   7/13/2019 2:05 AM     PROCEDURE:   XR CHEST 1 VW-     INDICATIONS:   cp     COMPARISON:  2/4/2019     TECHNIQUE:   [Portable chest radiograph]     FINDINGS:   Cardiomediastinal silhouette within normal limits. Mild fullness of the  central pulmonary vasculature without overt pulmonary edema. No  pneumothorax. No large effusion. Osseous structures intact.        Impression:       Stable chronic findings without acute process.     Electronically Signed By-Compa Chow On:7/13/2019 8:35 AM  This report was finalized on [id number] by  Compa Chow, .        reviewed    ECG/EMG Results (most recent)     Procedure Component Value Units Date/Time    ECG 12 Lead [[id number]] Collected:  07/13/19 0136     Updated:  07/13/19 0138    Narrative:       HEART RATE= 58  bpm  RR Interval= 1036  ms  NM Interval= 170  ms  P Horizontal Axis= 11  deg  P Front Axis= 18  deg  QRSD Interval= 79  ms  QT Interval= 418  ms  QRS Axis= 27  deg  T Wave Axis= 59  deg  - OTHERWISE NORMAL ECG -  Sinus bradycardia  Low voltage, precordial leads  Electronically Signed By:   Date and Time of Study: 2019-07-13 01:36:16        reviewed              Stress Test With Myocardial Perfusion One Day  · Diaphragmatic attenuation and GI artifacts are present.  · Left ventricular ejection fraction is normal.  · Myocardial perfusion imaging indicates a normal myocardial perfusion   study with no evidence of ischemia.  · Impressions are consistent with a low risk study.  · There is no prior study available  for comparison. Normal resting   perfusion seen. Stress with small apical defect with normal wall motion.   EF 67%, SSS0. SRS0. SDS0. .  · Low risk for ischemic heart disease.  · Findings consistent with a normal ECG stress test.  · Findings consistent with a normal ECG stress test.     XR Chest 1 View  Narrative:    DATE OF EXAM:   7/13/2019 2:05 AM     PROCEDURE:   XR CHEST 1 VW-     INDICATIONS:   cp     COMPARISON:  2/4/2019     TECHNIQUE:   [Portable chest radiograph]     FINDINGS:   Cardiomediastinal silhouette within normal limits. Mild fullness of the  central pulmonary vasculature without overt pulmonary edema. No  pneumothorax. No large effusion. Osseous structures intact.      Impression: Stable chronic findings without acute process.     Electronically Signed By-Compa Chow On:7/13/2019 8:35 AM  This report was finalized on [id number] by  Compa Chow, .      Assessment/Plan       Chest pain      Plan    Chest pain vs GI etiology  -She does have some reproducible pain on her left chest wall, but she denies any known injury.  She states that it has been sore for weeks. There is no obvious deformity.  If cardiac work up negative, would likely benefit from outpatient follow up with GI if symptoms improve.   -No acute findings in the ER.  No recent stress test.  -Serial troponin   -Myoview in the am    GERD  -Continue Dexilant and PPI  -PRN antiemetics     Chronic hip pain with narcotic dependence  -INSPECT verified, continue Hydrocodone  -Continue Neurontin and Celebrex    Hyperlipidemia  -Continue statin  -Hold Fish oil    Tobacco abuse  -Cessation highly encouraged   -Nicotine patch if needed     DVT Prophylaxis  -Bilateral SCD's     Disposition    Observation for cardiac work up    I discussed the patients findings and my recommendations with patient.    Leland Gonzalez MD  07/13/19  6:17 PM    Time: More than 50% of time spent in counseling and coordination of care:  Total face-to-face/floor time 10  min.  Time spent in counseling 10 min. Counseling included the following topics: Test results, plan of care, Cardiac vs GI    Patient seen and examined and at this time patient will get a stress Myoview if that is negative patient be discharged home to follow with GI as an outpatient.

## 2019-07-14 ENCOUNTER — READMISSION MANAGEMENT (OUTPATIENT)
Dept: CALL CENTER | Facility: HOSPITAL | Age: 64
End: 2019-07-14

## 2019-07-14 NOTE — OUTREACH NOTE
Prep Survey      Responses   Facility patient discharged from?  [facility]   Is patient eligible?  No   What are the reasons patient is not eligible?  Other [LACE of 1, Medicaid, and LOS <22hrs]   Does the patient have one of the following disease processes/diagnoses(primary or secondary)?  Other   Prep survey completed?  Yes          Mela Betts RN

## 2019-07-15 NOTE — PROGRESS NOTES
Case Management Discharge Note    Final Note: Home    Final Discharge Disposition Code: 01 - home or self-care

## 2019-07-17 ENCOUNTER — OFFICE VISIT (OUTPATIENT)
Dept: FAMILY MEDICINE CLINIC | Facility: CLINIC | Age: 64
End: 2019-07-17

## 2019-07-17 VITALS
SYSTOLIC BLOOD PRESSURE: 143 MMHG | HEART RATE: 63 BPM | BODY MASS INDEX: 26.84 KG/M2 | WEIGHT: 167 LBS | DIASTOLIC BLOOD PRESSURE: 89 MMHG | TEMPERATURE: 97.3 F | OXYGEN SATURATION: 100 % | HEIGHT: 66 IN

## 2019-07-17 DIAGNOSIS — J01.90 ACUTE SINUSITIS, RECURRENCE NOT SPECIFIED, UNSPECIFIED LOCATION: ICD-10-CM

## 2019-07-17 DIAGNOSIS — K21.9 GASTROESOPHAGEAL REFLUX DISEASE, ESOPHAGITIS PRESENCE NOT SPECIFIED: ICD-10-CM

## 2019-07-17 DIAGNOSIS — E78.2 MIXED HYPERLIPIDEMIA: Primary | ICD-10-CM

## 2019-07-17 PROBLEM — I10 HYPERTENSION: Status: ACTIVE | Noted: 2019-07-17

## 2019-07-17 PROBLEM — E78.5 HYPERLIPIDEMIA: Status: ACTIVE | Noted: 2018-08-31

## 2019-07-17 PROCEDURE — 99213 OFFICE O/P EST LOW 20 MIN: CPT | Performed by: FAMILY MEDICINE

## 2019-07-17 RX ORDER — FLUCONAZOLE 150 MG/1
150 TABLET ORAL ONCE
Qty: 1 TABLET | Refills: 0 | Status: SHIPPED | OUTPATIENT
Start: 2019-07-17 | End: 2019-07-17

## 2019-07-17 RX ORDER — ASPIRIN 81 MG/1
81 TABLET ORAL DAILY
COMMUNITY

## 2019-07-17 RX ORDER — AMOXICILLIN 875 MG/1
875 TABLET, COATED ORAL 2 TIMES DAILY
Qty: 20 TABLET | Refills: 0 | Status: SHIPPED | OUTPATIENT
Start: 2019-07-17 | End: 2019-07-27

## 2019-07-17 RX ORDER — LANOLIN ALCOHOL/MO/W.PET/CERES
1000 CREAM (GRAM) TOPICAL DAILY
COMMUNITY

## 2019-07-17 NOTE — PROGRESS NOTES
Subjective   Yvette Garvin is a 64 y.o. female.     Here for follow-up after she was admitted to the hospital at [facility] a few days ago with chest pain  Cardiac work-up was negative  She has a history of GI issues and she is supposed to follow-up with her gastroneurologist as an outpatient  Prior to going to ER she was very dizzy with N/V  She was having substernal/epigastric pain  She usu sees Dr. Vides  She is supposed to be taking Dexilant and Carafate  But has not been able to tolerate Carafate  She is having facial pain and pressure especially on the right         The following portions of the patient's history were reviewed and updated as appropriate: allergies, current medications, past family history, past medical history, past social history, past surgical history and problem list.  Past Medical History:   Diagnosis Date   • Arthritis    • GERD (gastroesophageal reflux disease)    • Hyperlipidemia      Past Surgical History:   Procedure Laterality Date   • APPENDECTOMY     • CHOLECYSTECTOMY       Family History   Problem Relation Age of Onset   • Cancer Mother    • Heart disease Father    • Cancer Brother      Social History     Socioeconomic History   • Marital status:      Spouse name: Not on file   • Number of children: Not on file   • Years of education: Not on file   • Highest education level: Not on file   Tobacco Use   • Smoking status: Current Every Day Smoker     Packs/day: 0.25     Types: Cigarettes   Substance and Sexual Activity   • Alcohol use: No     Frequency: Never   • Drug use: No   • Sexual activity: Defer         Current Outpatient Medications:   •  aspirin 81 MG EC tablet, Take 81 mg by mouth Daily., Disp: , Rfl:   •  Glucosamine-Chondroit-Vit C-Mn (GLUCOSAMINE 1500 COMPLEX PO), Take  by mouth., Disp: , Rfl:   •  Probiotic Product (PROBIOTIC-10 PO), Take  by mouth., Disp: , Rfl:   •  vitamin B-12 (CYANOCOBALAMIN) 1000 MCG tablet, Take 1,000 mcg by mouth Daily.,  Disp: , Rfl:   •  amoxicillin (AMOXIL) 875 MG tablet, Take 1 tablet by mouth 2 (Two) Times a Day for 10 days., Disp: 20 tablet, Rfl: 0  •  atorvastatin (LIPITOR) 10 MG tablet, Take 10 mg by mouth Daily., Disp: , Rfl:   •  celecoxib (CeleBREX) 200 MG capsule, Take 200 mg by mouth Every Evening., Disp: , Rfl:   •  dexlansoprazole (DEXILANT) 60 MG capsule, Take 60 mg by mouth Daily., Disp: , Rfl:   •  diclofenac (FLECTOR) 1.3 % patch patch, Apply 1 patch topically to the appropriate area as directed 2 (Two) Times a Day., Disp: , Rfl:   •  famotidine (PEPCID) 20 MG tablet, Take 20 mg by mouth Daily., Disp: , Rfl:   •  fluconazole (DIFLUCAN) 150 MG tablet, Take 1 tablet by mouth 1 (One) Time for 1 dose., Disp: 1 tablet, Rfl: 0  •  gabapentin (NEURONTIN) 100 MG capsule, Take 100 mg by mouth Every Night., Disp: , Rfl:   •  HYDROcodone-acetaminophen (NORCO) 7.5-325 MG per tablet, Take 1 tablet by mouth 3 (Three) Times a Day., Disp: , Rfl:   •  Omega-3 Fatty Acids (FISH OIL) 1000 MG capsule capsule, Take 1,200 mg by mouth every night at bedtime., Disp: , Rfl:   •  sucralfate (CARAFATE) 1 g tablet, Take 1 tablet by mouth 4 (Four) Times a Day Before Meals & at Bedtime for 30 days., Disp: 120 tablet, Rfl: 0    Review of Systems   Constitutional: Negative for diaphoresis, fatigue, fever, unexpected weight gain and unexpected weight loss.   HENT: Positive for congestion and sinus pressure. Negative for ear pain, sore throat, tinnitus, trouble swallowing and voice change.    Eyes: Negative for double vision.   Respiratory: Negative for cough, chest tightness and shortness of breath.    Cardiovascular: Positive for chest pain. Negative for palpitations and leg swelling.   Gastrointestinal: Positive for nausea, vomiting, GERD and indigestion. Negative for blood in stool, constipation and diarrhea.   Neurological: Positive for dizziness and headache. Negative for syncope.     /89 (BP Location: Right arm, Patient Position:  "Sitting, Cuff Size: Adult)   Pulse 63   Temp 97.3 °F (36.3 °C) (Oral)   Ht 167.6 cm (66\")   Wt 75.8 kg (167 lb)   SpO2 100%   BMI 26.95 kg/m²       Objective   Physical Exam   Constitutional: She is oriented to person, place, and time. She appears well-developed and well-nourished. No distress.   Does not look like she feels well   HENT:   Head: Normocephalic and atraumatic.   Right Ear: Hearing, tympanic membrane, external ear and ear canal normal.   Left Ear: Hearing, tympanic membrane, external ear and ear canal normal.   Nose: Right sinus exhibits maxillary sinus tenderness and frontal sinus tenderness. Left sinus exhibits no maxillary sinus tenderness and no frontal sinus tenderness.   Mouth/Throat: Uvula is midline and mucous membranes are normal.   Neck: Neck supple. No JVD present.   Cardiovascular: Normal rate, regular rhythm, normal heart sounds and intact distal pulses. Exam reveals no gallop and no friction rub.   No murmur heard.  Pulmonary/Chest: Effort normal and breath sounds normal. No respiratory distress. She has no wheezes. She has no rales.   Abdominal: Soft. There is no tenderness.   Musculoskeletal: She exhibits no edema.   Lymphadenopathy:     She has no cervical adenopathy.   Neurological: She is alert and oriented to person, place, and time.   No ataxia   Skin: Skin is warm and dry. Turgor is normal. No rash noted.   Psychiatric: Her mood appears anxious.         Assessment/Plan   Problems Addressed this Visit        Cardiovascular and Mediastinum    Hyperlipidemia - Primary    Relevant Orders    Lipid panel       Respiratory    Sinusitis, acute       Digestive    Gastroesophageal reflux disease                 "

## 2019-07-17 NOTE — PATIENT INSTRUCTIONS
Call and schedule appt with Dr. iVdes  No sudden movements  Drink more water  Can cancel appt in Aug  F/u in 6 mo

## 2019-07-26 ENCOUNTER — OFFICE (AMBULATORY)
Dept: URBAN - METROPOLITAN AREA CLINIC 64 | Facility: CLINIC | Age: 64
End: 2019-07-26

## 2019-07-26 VITALS
WEIGHT: 167 LBS | SYSTOLIC BLOOD PRESSURE: 119 MMHG | HEIGHT: 66 IN | DIASTOLIC BLOOD PRESSURE: 78 MMHG | HEART RATE: 64 BPM

## 2019-07-26 DIAGNOSIS — R07.9 CHEST PAIN, UNSPECIFIED: ICD-10-CM

## 2019-07-26 DIAGNOSIS — R14.2 ERUCTATION: ICD-10-CM

## 2019-07-26 DIAGNOSIS — K21.9 GASTRO-ESOPHAGEAL REFLUX DISEASE WITHOUT ESOPHAGITIS: ICD-10-CM

## 2019-07-26 DIAGNOSIS — R11.2 NAUSEA WITH VOMITING, UNSPECIFIED: ICD-10-CM

## 2019-07-26 DIAGNOSIS — R10.13 EPIGASTRIC PAIN: ICD-10-CM

## 2019-07-26 PROCEDURE — 99214 OFFICE O/P EST MOD 30 MIN: CPT | Performed by: NURSE PRACTITIONER

## 2019-09-05 RX ORDER — ATORVASTATIN CALCIUM 20 MG/1
TABLET, FILM COATED ORAL
Qty: 45 TABLET | Refills: 2 | Status: SHIPPED | OUTPATIENT
Start: 2019-09-05 | End: 2020-05-27

## 2019-09-06 ENCOUNTER — OFFICE (AMBULATORY)
Dept: URBAN - METROPOLITAN AREA PATHOLOGY 4 | Facility: PATHOLOGY | Age: 64
End: 2019-09-06

## 2019-09-06 ENCOUNTER — ON CAMPUS - OUTPATIENT (AMBULATORY)
Dept: URBAN - METROPOLITAN AREA HOSPITAL 2 | Facility: HOSPITAL | Age: 64
End: 2019-09-06

## 2019-09-06 VITALS
HEIGHT: 66 IN | SYSTOLIC BLOOD PRESSURE: 144 MMHG | RESPIRATION RATE: 16 BRPM | OXYGEN SATURATION: 100 % | DIASTOLIC BLOOD PRESSURE: 85 MMHG | SYSTOLIC BLOOD PRESSURE: 118 MMHG | DIASTOLIC BLOOD PRESSURE: 82 MMHG | HEART RATE: 57 BPM | WEIGHT: 166 LBS | RESPIRATION RATE: 18 BRPM | SYSTOLIC BLOOD PRESSURE: 145 MMHG | DIASTOLIC BLOOD PRESSURE: 78 MMHG | HEART RATE: 51 BPM | HEART RATE: 55 BPM | TEMPERATURE: 96.8 F | HEART RATE: 52 BPM | OXYGEN SATURATION: 98 % | OXYGEN SATURATION: 96 % | DIASTOLIC BLOOD PRESSURE: 72 MMHG | SYSTOLIC BLOOD PRESSURE: 109 MMHG | RESPIRATION RATE: 17 BRPM | DIASTOLIC BLOOD PRESSURE: 66 MMHG

## 2019-09-06 DIAGNOSIS — K31.89 OTHER DISEASES OF STOMACH AND DUODENUM: ICD-10-CM

## 2019-09-06 DIAGNOSIS — K44.9 DIAPHRAGMATIC HERNIA WITHOUT OBSTRUCTION OR GANGRENE: ICD-10-CM

## 2019-09-06 DIAGNOSIS — R07.9 CHEST PAIN, UNSPECIFIED: ICD-10-CM

## 2019-09-06 DIAGNOSIS — K29.70 GASTRITIS, UNSPECIFIED, WITHOUT BLEEDING: ICD-10-CM

## 2019-09-06 DIAGNOSIS — R10.13 EPIGASTRIC PAIN: ICD-10-CM

## 2019-09-06 LAB
GI HISTOLOGY: A. SELECT: (no result)
GI HISTOLOGY: PDF REPORT: (no result)

## 2019-09-06 PROCEDURE — 43239 EGD BIOPSY SINGLE/MULTIPLE: CPT | Performed by: INTERNAL MEDICINE

## 2019-09-06 PROCEDURE — 88305 TISSUE EXAM BY PATHOLOGIST: CPT | Performed by: INTERNAL MEDICINE

## 2019-10-18 ENCOUNTER — TRANSCRIBE ORDERS (OUTPATIENT)
Dept: PHYSICAL THERAPY | Facility: CLINIC | Age: 64
End: 2019-10-18

## 2019-10-18 DIAGNOSIS — M51.36 DDD (DEGENERATIVE DISC DISEASE), LUMBAR: Primary | ICD-10-CM

## 2019-10-30 ENCOUNTER — OFFICE (AMBULATORY)
Dept: URBAN - METROPOLITAN AREA CLINIC 64 | Facility: CLINIC | Age: 64
End: 2019-10-30

## 2019-10-30 VITALS
WEIGHT: 172 LBS | DIASTOLIC BLOOD PRESSURE: 84 MMHG | SYSTOLIC BLOOD PRESSURE: 139 MMHG | HEART RATE: 55 BPM | HEIGHT: 66 IN

## 2019-10-30 DIAGNOSIS — K21.9 GASTRO-ESOPHAGEAL REFLUX DISEASE WITHOUT ESOPHAGITIS: ICD-10-CM

## 2019-10-30 PROCEDURE — 99212 OFFICE O/P EST SF 10 MIN: CPT | Performed by: INTERNAL MEDICINE

## 2019-10-30 RX ORDER — DEXLANSOPRAZOLE 60 MG/1
CAPSULE, DELAYED RELEASE ORAL
Qty: 90 | Refills: 3 | Status: ACTIVE

## 2019-10-30 RX ORDER — FAMOTIDINE 40 MG/1
TABLET, FILM COATED ORAL
Qty: 60 | Refills: 0 | Status: ACTIVE

## 2019-11-01 ENCOUNTER — TREATMENT (OUTPATIENT)
Dept: PHYSICAL THERAPY | Facility: CLINIC | Age: 64
End: 2019-11-01

## 2019-11-01 DIAGNOSIS — M51.36 DEGENERATION OF LUMBAR INTERVERTEBRAL DISC: Primary | ICD-10-CM

## 2019-11-01 PROCEDURE — 97163 PT EVAL HIGH COMPLEX 45 MIN: CPT | Performed by: PHYSICAL THERAPIST

## 2019-11-01 NOTE — PROGRESS NOTES
Physical Therapy Initial Evaluation and Plan of Care    Patient: Yvette Garvin   : 1955  Diagnosis/ICD-10 Code:  Degeneration of lumbar intervertebral disc [M51.36]  Referring practitioner: Brennan Bianchi MD  Date of Initial Visit: 2019  Today's Date: 2019  Patient seen for 1 sessions           Subjective Questionnaire: Oswestry: 26%      Subjective Evaluation    History of Present Illness  Date of surgery: 2019  Mechanism of injury: 63 y/o female with hx of chronic LBP and sacral pain (s/p L SI fusion 19): pain better after surgery until 2 months ago; had been getting injections in hip/buttock - symptoms worse in last month. C/o pain L LB and SI region; mild nerve pain down back of L leg above knee. States she also has a tear in the hip/buttock musculature.      Patient Occupation: self-employed: house keeping Quality of life: good    Pain  Current pain ratin  At best pain ratin  At worst pain rating: 10  Location: see above  Quality: dull ache, sharp, discomfort and radiating (above knee)  Relieving factors: rest (activity; injections)  Aggravating factors: sleeping, squatting, lifting and stairs (sitting for long peiods; bending; lifting)  Progression: worsening    Social Support  Lives in: one-story house (basement: washer/dryer)  Lives with: alone    Hand dominance: right    Diagnostic Tests  MRI studies: abnormal (see Epic for spine: gluteal tears.)    Treatments  Previous treatment: physical therapy, injection treatment and medication (aquatic)  Current treatment: injection treatment and medication  Patient Goals  Patient goals for therapy: decreased pain and increased strength             Objective       Postural Observations  Seated posture: fair  Standing posture: fair  Correction of posture: makes symptoms worse    Additional Postural Observation Details  Sits shifted to R to offload L buttock    Palpation   Left   Hypertonic in the erector spinae and lumbar  paraspinals.     Right   Hypertonic in the erector spinae and lumbar paraspinals.     Tenderness     Lumbar Spine  Tenderness in the spinous process.     Left Hip   Tenderness in the PSIS.     Additional Tenderness Details  Significant tenderness lower lumbar spine and paraspinal musculature; L SI/PSIS and buttock musculature.    Neurological Testing     Sensation     Lumbar   Left   Intact: light touch    Right   Intact: light touch    Additional Neurological Details  n/a    Active Range of Motion   Cervical/Thoracic Spine     Thoracic   Flexion: Active thoracic flexion: mod.   Extension: Active thoracic extension: mod.     Lumbar   Flexion: Active lumbar flexion: mod. with pain  Extension: Active lumbar extension: min/mod. with pain  Left rotation: Active left lumbar rotation: mod. with pain  Right rotation: Active right lumbar rotation: mod. with pain    Additional Active Range of Motion Details  Significant pain in lumbar spine with flexion; mild end-range pain with extension in standing; no increase in pain with prone extension.    Strength/Myotome Testing     Lumbar     Right   Normal strength    Left Hip   Planes of Motion   Flexion: 4-  Extension: 3+  Abduction: 3+  External rotation: 3+  Internal rotation: 3+    Right Hip   Planes of Motion   Flexion: 4+  Extension: 4+  Abduction: 4+  External rotation: 4+  Internal rotation: 4+    Left Knee   Flexion: 4-  Extension: 4-    Right Knee   Flexion: 4+  Extension: 4+    Left Ankle/Foot   Dorsiflexion: 4-  Plantar flexion: 3  Inversion: 4-  Eversion: 4-  Great toe extension: 3+    Right Ankle/Foot   Dorsiflexion: 4+  Plantar flexion: 4+  Inversion: 4+  Eversion: 4+  Great toe extension: 4+    Additional Strength Details  Heel raises: R 20 L 10    Muscle Activation     Additional Muscle Activation Details  Fair TA activation with supine marching and functional transfers - required correction of sit to supine.    Tests     Additional Tests Details  DARRELL's: (+)  L  SLR: neg R;  mild pain L > 80 degrees  Mild thoracic scoliosis.  Gillet's: (+) L  Lumbar distraction and single leg distraction: (+) L w/ decrease in pain.    Ambulation     Ambulation: Stairs   Ascend stairs: independent  Pattern: reciprocal    Additional Stairs Ambulation Details  Mild pain with ascent and descent of stairs.    Observational Gait   Gait: antalgic   Walking speed within functional limits.     Additional Observational Gait Details  Mild antalgia noted with initiation of gait - resolves as patient ambulated longer.         Assessment & Plan     Assessment  Impairments: abnormal gait, abnormal muscle firing, abnormal muscle tone, abnormal or restricted ROM, activity intolerance, impaired physical strength, lacks appropriate home exercise program and pain with function  Assessment details: Pt presents to PT with symptoms consistent with DDD and SI/hip dysfuction.  Pt is appropriate for the skilled PT interventions to address the deficits noted above; has the potential to benefit from PT. Will be seen until patient plateaus, goals met, or patient worsens.      Prognosis: good  Functional Limitations: lifting, sleeping, walking, pulling, uncomfortable because of pain, sitting and stooping  Goals  Plan Goals: SHORT TERM GOALS: Time for Goal Achievement: 4 weeks    1.  Patient to be compliant w/ the HEP and tolerate progression.                            2.  Pain level < 4/10 at worst with mentioned activities to improve function.  3.  Increased lumbar AROM to by 25% in all planes to allow for increased ease with sit-stand transfers and functional activities.  4. 25% improvement subjectively with function and activities.    LONG TERM GOALS: Time for Goal Achievement: D/C  1.  Outcome survey to show significant improvement  2.  Pain level < 1-2/10 with all listed activities to return to normal.  3.  Lumbar AROM to WFL to allow for return to household, work & recreational activities w/o increase in  symptoms.  4.  (B) LE and lower abdominal strength to 5/5 to allow for pushing, pulling and activities to occur without pain (driving, sitting, household  & Job requirements).  5. Voice readiness for d/c.        Plan  Therapy options: will be seen for skilled physical therapy services  Planned modality interventions: electrical stimulation/Russian stimulation, cryotherapy, traction and ultrasound  Other planned modality interventions: dry needling  Planned therapy interventions: manual therapy, abdominal trunk stabilization, ADL retraining, neuromuscular re-education, spinal/joint mobilization, soft tissue mobilization, strengthening, stretching, therapeutic activities, functional ROM exercises, flexibility, body mechanics training and home exercise program  Frequency: 2x week  Duration in visits: 10  Duration in weeks: 5  Treatment plan discussed with: patient        History # of Personal Factors and/or Comorbidities: MODERATE (1-2)  Examination of Body System(s): # of elements: MODERATE (3)  Clinical Presentation: EVOLVING  Clinical Decision Making: MODERATE      Timed:         Manual Therapy:         mins  22469;     Therapeutic Exercise:         mins  63148;     Neuromuscular Supriya:        mins  27086;    Therapeutic Activity:          mins  12374;     Gait Training:           mins  32468;     Ultrasound:          mins  41616;    Ionto                                   mins   70143  Self Care                            mins   11666  Aquatic                               mins 69760      Un-Timed:  Electrical Stimulation:         mins  51820 ( );  Dry Needling          mins self-pay  Traction          mins 17704  Low Eval          Mins  18995  Mod Eval     45     Mins  08903  High Eval                            Mins  42055  Re-Eval                               mins  40176        Timed Treatment:      mins   Total Treatment:     45   mins    PT SIGNATURE: Thomas Gagnon, CASEY   DATE TREATMENT INITIATED:  11/1/2019    90 Day Recertification  Certification Period: 1/30/2020  I certify that the therapy services are furnished while this patient is under my care.  The services outlined above are required by this patient, and will be reviewed every 90 days.     PHYSICIAN: Brennan Bianchi MD      DATE:     Please sign and return via fax to  .. Thank you, [facility].

## 2019-11-22 ENCOUNTER — TREATMENT (OUTPATIENT)
Dept: PHYSICAL THERAPY | Facility: CLINIC | Age: 64
End: 2019-11-22

## 2019-11-22 DIAGNOSIS — M51.36 DEGENERATION OF LUMBAR INTERVERTEBRAL DISC: Primary | ICD-10-CM

## 2019-11-22 PROCEDURE — 97110 THERAPEUTIC EXERCISES: CPT | Performed by: PHYSICAL THERAPIST

## 2019-11-22 PROCEDURE — 97140 MANUAL THERAPY 1/> REGIONS: CPT | Performed by: PHYSICAL THERAPIST

## 2019-11-22 PROCEDURE — 97035 APP MDLTY 1+ULTRASOUND EA 15: CPT | Performed by: PHYSICAL THERAPIST

## 2019-11-22 NOTE — PROGRESS NOTES
Physical Therapy Daily Progress Note    VISIT#: 2    Subjective   Yvette Garvin reports: back and SI irritated today: difficult work week.      Objective     See Exercise, Manual, and Modality Logs for complete treatment.     Patient Education:    Assessment/Plan - Added Single Knee to chest to HEP - decreased pain post-treatment: US and prone extension w/ MH.      Progress per Plan of Care            Timed:         Manual Therapy:    10     mins  03476;     Therapeutic Exercise:    20     mins  74410;     Neuromuscular Supriya:        mins  14926;    Therapeutic Activity:          mins  26678;     Gait Training:           mins  62175;     Ultrasound:       10   mins  25068;    Ionto                                   mins   30054  Self Care                            mins   52213  Canalith Repos                   mins  4209  Aquatic                               mins 73762    Un-Timed:  Electrical Stimulation:         mins  21438 ( );  Dry Needling          mins self-pay  Traction          mins 71495  Low Eval          Mins  57357  Mod Eval          Mins  90580  High Eval                            Mins  71771  Re-Eval                               mins  57912    Timed Treatment:   40   mins   Total Treatment:     40   mins    Thomas Gagnon PT

## 2019-11-26 ENCOUNTER — TREATMENT (OUTPATIENT)
Dept: PHYSICAL THERAPY | Facility: CLINIC | Age: 64
End: 2019-11-26

## 2019-11-26 DIAGNOSIS — M51.36 DEGENERATION OF LUMBAR INTERVERTEBRAL DISC: Primary | ICD-10-CM

## 2019-11-26 PROCEDURE — 97140 MANUAL THERAPY 1/> REGIONS: CPT | Performed by: PHYSICAL THERAPIST

## 2019-11-26 PROCEDURE — 97035 APP MDLTY 1+ULTRASOUND EA 15: CPT | Performed by: PHYSICAL THERAPIST

## 2019-11-26 PROCEDURE — 97110 THERAPEUTIC EXERCISES: CPT | Performed by: PHYSICAL THERAPIST

## 2019-11-26 NOTE — PROGRESS NOTES
Physical Therapy Daily Progress Note    VISIT#: 3    Subjective   Yvette Garvin reports: distraction seemed to help last visit. Did fine with nustep. Does not need MHP today. Sore from work today. Busy work week.     Objective     See Exercise, Manual, and Modality Logs for complete treatment.     Treatment as previous session with mild progress of core stability exercise.    Assessment/Plan Pt reports decreased pain post treatment.       Progress per Plan of Care Monitor and progress as tolerated.             Timed:         Manual Therapy:   12      mins  27901;     Therapeutic Exercise:    13     mins  46036;     Neuromuscular Supriya:        mins  61796;    Therapeutic Activity:          mins  82081;     Gait Training:           mins  99198;     Ultrasound:     10     mins  87877;    Ionto                                   mins   08427  Self Care                            mins   14744  Canalith Repos                   mins  4209  Aquatic                               mins 33589    Un-Timed:  Electrical Stimulation:         mins  89999 ( );  Dry Needling          mins self-pay  Traction          mins 77352  Low Eval          Mins  75503  Mod Eval          Mins  28110  High Eval                            Mins  03816  Re-Eval                               mins  06708    Timed Treatment:   45   mins   Total Treatment:     45   mins    Mariel Santos, PT

## 2019-12-02 ENCOUNTER — TREATMENT (OUTPATIENT)
Dept: PHYSICAL THERAPY | Facility: CLINIC | Age: 64
End: 2019-12-02

## 2019-12-02 DIAGNOSIS — M51.36 DEGENERATION OF LUMBAR INTERVERTEBRAL DISC: Primary | ICD-10-CM

## 2019-12-02 PROCEDURE — 97110 THERAPEUTIC EXERCISES: CPT | Performed by: PHYSICAL THERAPIST

## 2019-12-02 PROCEDURE — 97140 MANUAL THERAPY 1/> REGIONS: CPT | Performed by: PHYSICAL THERAPIST

## 2019-12-02 PROCEDURE — 97035 APP MDLTY 1+ULTRASOUND EA 15: CPT | Performed by: PHYSICAL THERAPIST

## 2019-12-02 NOTE — PROGRESS NOTES
Physical Therapy Daily Progress Note    VISIT#: 4    Subjective   Yvette Garvin reports: Reports distraction helping - 40% better w/ reduction in pain and improved movement.    Objective     See Exercise, Manual, and Modality Logs for complete treatment.     Treatment as previous session with mild progress of core stability exercise.    Assessment & Plan     Assessment  Assessment details:  Improved gait, less antalgia noted with transfers as well. STG's patrtially met    Prognosis: good    Goals  Plan Goals: SHORT TERM GOALS: Time for Goal Achievement: 4 weeks    1.  Patient to be compliant w/ the HEP and tolerate progression. MET                    2.  Pain level < 4/10 at worst with mentioned activities to improve function.   3.  Increased lumbar AROM to by 25% in all planes to allow for increased ease with sit-stand transfers and functional activities. MET  4. 25% improvement subjectively with function and activities. MET    LONG TERM GOALS: Time for Goal Achievement: D/C  1.  Outcome survey to show significant improvement  2.  Pain level < 1-2/10 with all listed activities to return to normal.  3.  Lumbar AROM to WFL to allow for return to household, work & recreational activities w/o increase in symptoms.  4.  (B) LE and lower abdominal strength to 5/5 to allow for pushing, pulling and activities to occur without pain (driving, sitting, household  & Job requirements).  5. Voice readiness for d/c.               Progress per Plan of Care Monitor and progress as tolerated.             Timed:         Manual Therapy:   15      mins  62333;     Therapeutic Exercise:    20     mins  65726;     Neuromuscular Supriya:        mins  83786;    Therapeutic Activity:          mins  14991;     Gait Training:           mins  49038;     Ultrasound:     10     mins  59295;    Ionto                                   mins   98386  Self Care                            mins   97140  Canalith Repos                   mins   4209  Aquatic                               mins 46368    Un-Timed:  Electrical Stimulation:         mins  14834 ( );  Dry Needling          mins self-pay  Traction          mins 78557  Low Eval          Mins  42459  Mod Eval          Mins  24210  High Eval                            Mins  58611  Re-Eval                               mins  69243    Timed Treatment:   45   mins   Total Treatment:     45   mins    Thomas Gagnon, PT

## 2019-12-04 ENCOUNTER — TREATMENT (OUTPATIENT)
Dept: PHYSICAL THERAPY | Facility: CLINIC | Age: 64
End: 2019-12-04

## 2019-12-04 DIAGNOSIS — M51.36 DEGENERATION OF LUMBAR INTERVERTEBRAL DISC: Primary | ICD-10-CM

## 2019-12-04 PROCEDURE — 97110 THERAPEUTIC EXERCISES: CPT | Performed by: PHYSICAL THERAPIST

## 2019-12-04 PROCEDURE — 97035 APP MDLTY 1+ULTRASOUND EA 15: CPT | Performed by: PHYSICAL THERAPIST

## 2019-12-04 PROCEDURE — 97140 MANUAL THERAPY 1/> REGIONS: CPT | Performed by: PHYSICAL THERAPIST

## 2019-12-04 NOTE — PROGRESS NOTES
Physical Therapy Daily Progress Note    VISIT#: 5    Subjective   Yvette Garvin reports: Doing well until she took brace off and irritated SI/LB area: burning in buttock    Objective     See Exercise, Manual, and Modality Logs for complete treatment.     Assessment & Plan     Assessment  Assessment details: Burning sensation abolished post-therapy.  Prognosis: good    Goals  Plan Goals: SHORT TERM GOALS: Time for Goal Achievement: 4 weeks    1.  Patient to be compliant w/ the HEP and tolerate progression. MET                    2.  Pain level < 4/10 at worst with mentioned activities to improve function.   3.  Increased lumbar AROM to by 25% in all planes to allow for increased ease with sit-stand transfers and functional activities. MET  4. 25% improvement subjectively with function and activities. MET    LONG TERM GOALS: Time for Goal Achievement: D/C  1.  Outcome survey to show significant improvement  2.  Pain level < 1-2/10 with all listed activities to return to normal.  3.  Lumbar AROM to WFL to allow for return to household, work & recreational activities w/o increase in symptoms.  4.  (B) LE and lower abdominal strength to 5/5 to allow for pushing, pulling and activities to occur without pain (driving, sitting, household  & Job requirements).  5. Voice readiness for d/c.               Progress per Plan of Care Monitor and progress as tolerated.             Timed:         Manual Therapy:   10      mins  97235;     Therapeutic Exercise:    25     mins  19998;     Neuromuscular Supriya:        mins  20730;    Therapeutic Activity:          mins  25805;     Gait Training:           mins  01270;     Ultrasound:     10     mins  03956;    Ionto                                   mins   85063  Self Care                            mins   15930  Canalith Repos                   mins  4209  Aquatic                               mins 28528    Un-Timed:  Electrical Stimulation:         mins  39520 ( );  Dry  Needling          mins self-pay  Traction          mins 84576  Low Eval          Mins  16968  Mod Eval          Mins  67333  High Eval                            Mins  61117  Re-Eval                               mins  04457    Timed Treatment:   45   mins   Total Treatment:     45   mins    Thomas Gagnon, PT

## 2019-12-13 ENCOUNTER — TREATMENT (OUTPATIENT)
Dept: PHYSICAL THERAPY | Facility: CLINIC | Age: 64
End: 2019-12-13

## 2019-12-13 DIAGNOSIS — M51.36 DEGENERATION OF LUMBAR INTERVERTEBRAL DISC: Primary | ICD-10-CM

## 2019-12-13 PROCEDURE — 97110 THERAPEUTIC EXERCISES: CPT | Performed by: PHYSICAL THERAPIST

## 2019-12-13 PROCEDURE — 97140 MANUAL THERAPY 1/> REGIONS: CPT | Performed by: PHYSICAL THERAPIST

## 2019-12-13 PROCEDURE — 97035 APP MDLTY 1+ULTRASOUND EA 15: CPT | Performed by: PHYSICAL THERAPIST

## 2019-12-13 NOTE — PROGRESS NOTES
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 Had been doing well prior to that.    Objective     See Exercise, Manual, and Modality Logs for complete treatment.     Assessment & Plan     Assessment  Assessment details: Continues to respond to distraction and interventions. Very TTP at L SI/buttock.   Prognosis: good    Goals  Plan Goals: SHORT TERM GOALS: Time for Goal Achievement: 4 weeks    1.  Patient to be compliant w/ the HEP and tolerate progression. MET                    2.  Pain level < 4/10 at worst with mentioned activities to improve function.   3.  Increased lumbar AROM to by 25% in all planes to allow for increased ease with sit-stand transfers and functional activities. MET  4. 25% improvement subjectively with function and activities. MET    LONG TERM GOALS: Time for Goal Achievement: D/C  1.  Outcome survey to show significant improvement  2.  Pain level < 1-2/10 with all listed activities to return to normal.  3.  Lumbar AROM to WFL to allow for return to household, work & recreational activities w/o increase in symptoms.  4.  (B) LE and lower abdominal strength to 5/5 to allow for pushing, pulling and activities to occur without pain (driving, sitting, household  & Job requirements).  5. Voice readiness for d/c.               Progress per Plan of Care Monitor and progress as tolerated.             Timed:         Manual Therapy:   10      mins  32941;     Therapeutic Exercise:    25     mins  86250;     Neuromuscular Supriya:        mins  27434;    Therapeutic Activity:          mins  46231;     Gait Training:           mins  44722;     Ultrasound:     10     mins  09084;    Ionto                                   mins   29916  Self Care                            mins   55677  Canalith Repos                   mins  4209  Aquatic                               mins  50844    Un-Timed:  Electrical Stimulation:         mins  19991 ( );  Dry Needling          mins self-pay  Traction          mins 26471  Low Eval          Mins  44718  Mod Eval          Mins  76795  High Eval                            Mins  76362  Re-Eval                               mins  54957    Timed Treatment:   45   mins   Total Treatment:     45   mins    Thomas Gagnon, PT

## 2019-12-20 ENCOUNTER — TREATMENT (OUTPATIENT)
Dept: PHYSICAL THERAPY | Facility: CLINIC | Age: 64
End: 2019-12-20

## 2019-12-20 DIAGNOSIS — M51.36 DEGENERATION OF LUMBAR INTERVERTEBRAL DISC: Primary | ICD-10-CM

## 2019-12-20 PROCEDURE — 97140 MANUAL THERAPY 1/> REGIONS: CPT | Performed by: PHYSICAL THERAPIST

## 2019-12-20 PROCEDURE — 97035 APP MDLTY 1+ULTRASOUND EA 15: CPT | Performed by: PHYSICAL THERAPIST

## 2019-12-20 PROCEDURE — 97110 THERAPEUTIC EXERCISES: CPT | Performed by: PHYSICAL THERAPIST

## 2020-01-10 ENCOUNTER — TREATMENT (OUTPATIENT)
Dept: PHYSICAL THERAPY | Facility: CLINIC | Age: 65
End: 2020-01-10

## 2020-01-10 DIAGNOSIS — M51.36 DEGENERATION OF LUMBAR INTERVERTEBRAL DISC: Primary | ICD-10-CM

## 2020-01-10 PROCEDURE — 97110 THERAPEUTIC EXERCISES: CPT | Performed by: PHYSICAL THERAPIST

## 2020-01-10 PROCEDURE — 97035 APP MDLTY 1+ULTRASOUND EA 15: CPT | Performed by: PHYSICAL THERAPIST

## 2020-01-10 PROCEDURE — 97140 MANUAL THERAPY 1/> REGIONS: CPT | Performed by: PHYSICAL THERAPIST

## 2020-01-10 NOTE — PROGRESS NOTES
"Physical Therapy Daily Progress Note    VISIT#: 8/10 in POC.      Subjective   Yvette Garvin reports: therapy really helping; did not require injection.    Objective   PMHX:  s/p L SI fusion 2/14/19    See Exercise, Manual, and Modality Logs for complete treatment.     Patient Education: advised to use caution with return to work activities.    Assessment/Plan :  Less tenderness to palpation L SI - decrease in pain post-therapy.    Progress per Plan of Care\"  - Monitor response and continue as tolerated.             Timed:         Manual Therapy: 15        mins  35651;     Therapeutic Exercise:     20    mins  21310;     Neuromuscular Supriya:        mins  74673;    Therapeutic Activity:          mins  18097;     Gait Training:           mins  33457;     Ultrasound:    10      mins  37084;    Ionto                                   mins   77155  Self Care                            mins   12931  Canalith Repos                   mins  4209  Aquatic                               mins 09431    Un-Timed:  Electrical Stimulation:         mins  26095 ( );  Dry Needling          mins self-pay  Traction          mins 13954  Low Eval          Mins  60861  Mod Eval          Mins  19944  High Eval                            Mins  00875  Re-Eval                               mins  87729    Timed Treatment: 45     mins   Total Treatment:   45     mins    Thomas Gagnon PT  "

## 2020-01-13 ENCOUNTER — TREATMENT (OUTPATIENT)
Dept: PHYSICAL THERAPY | Facility: CLINIC | Age: 65
End: 2020-01-13

## 2020-01-13 DIAGNOSIS — M51.36 DEGENERATION OF LUMBAR INTERVERTEBRAL DISC: Primary | ICD-10-CM

## 2020-01-13 PROCEDURE — 97110 THERAPEUTIC EXERCISES: CPT | Performed by: PHYSICAL THERAPIST

## 2020-01-13 PROCEDURE — 97035 APP MDLTY 1+ULTRASOUND EA 15: CPT | Performed by: PHYSICAL THERAPIST

## 2020-01-13 PROCEDURE — 97140 MANUAL THERAPY 1/> REGIONS: CPT | Performed by: PHYSICAL THERAPIST

## 2020-01-13 NOTE — PROGRESS NOTES
Physical Therapy Daily Progress Note    VISIT#: 9/10 in POC.  Authorized 8/10 insurance.     Subjective   Yvette Garvin reports: Pain today 8/10 in L LB and L piriformis. She has been doing better until today when she aggravated pain with activities on the job.       Objective   PMHX:  s/p L SI fusion 2/14/19    See Exercise, Manual, and Modality Logs for complete treatment.     Patient Education:  Pt. Instructed in how to use tennis ball for self piriformis release.     Assessment/Plan      Other            Timed:         Manual Therapy:  15       mins  72360;     Therapeutic Exercise:    20     mins  50317;     Neuromuscular Supriya:        mins  86582;    Therapeutic Activity:          mins  40032;     Gait Training:           mins  47407;     Ultrasound:    10      mins  17140;    Ionto                                   mins   44680  Self Care                            mins   54031  Canalith Repos                   mins  4209  Aquatic                               mins 24522    Un-Timed:  Electrical Stimulation:         mins  07192 ( );  Dry Needling          mins self-pay  Traction          mins 20364  Low Eval          Mins  68355  Mod Eval          Mins  46933  High Eval                            Mins  15440  Re-Eval                               mins  71197    Timed Treatment:  45    mins   Total Treatment:    45    mins    Margie Townsend PTA

## 2020-01-17 ENCOUNTER — TREATMENT (OUTPATIENT)
Dept: PHYSICAL THERAPY | Facility: CLINIC | Age: 65
End: 2020-01-17

## 2020-01-17 DIAGNOSIS — M51.36 DEGENERATION OF LUMBAR INTERVERTEBRAL DISC: Primary | ICD-10-CM

## 2020-01-17 PROCEDURE — 97140 MANUAL THERAPY 1/> REGIONS: CPT | Performed by: PHYSICAL THERAPIST

## 2020-01-17 PROCEDURE — 97035 APP MDLTY 1+ULTRASOUND EA 15: CPT | Performed by: PHYSICAL THERAPIST

## 2020-01-17 PROCEDURE — 97110 THERAPEUTIC EXERCISES: CPT | Performed by: PHYSICAL THERAPIST

## 2020-01-17 NOTE — PROGRESS NOTES
Physical Therapy Daily Progress Note    VISIT#: 10    Subjective   Yvette Garvin reports: 75% to 80% improvement. To try spinal traction from [employer] at home and continue HEP. Pain and function much better, but still has flair-ups with increased intensity.  Oswestry: 32%    Objective       Postural Observations  Seated posture: good  Standing posture: good    Additional Postural Observation Details  Sits with less pain and not shifted .    Palpation   Left   Hypertonic in the lumbar paraspinals.     Right   Hypertonic in the lumbar paraspinals.     Tenderness     Lumbar Spine  Tenderness in the spinous process.     Left Hip   Tenderness in the PSIS.     Additional Tenderness Details  Min to mod tenderness lower lumbar spine and paraspinal musculature; L SI/PSIS and buttock musculature.    Neurological Testing     Sensation     Lumbar   Left   Intact: light touch    Right   Intact: light touch    Active Range of Motion   Cervical/Thoracic Spine     Thoracic   Flexion: Active thoracic flexion: mod.   Extension: Active thoracic extension: mod.     Lumbar   Flexion: Active lumbar flexion: min to mod. with pain  Extension: Active lumbar extension: min.   Left rotation: Active left lumbar rotation: min. with pain  Right rotation: Active right lumbar rotation: min. with pain    Additional Active Range of Motion Details  Responds well to lumbar extension with centralization of pain    Strength/Myotome Testing     Lumbar     Right   Normal strength    Left Hip   Planes of Motion   Flexion: 4-  Extension: 3+  Abduction: 3+  External rotation: 4-  Internal rotation: 4-    Right Hip   Planes of Motion   Flexion: 4+  Extension: 4+  Abduction: 4+  External rotation: 4+  Internal rotation: 4+    Left Knee   Flexion: 4-  Extension: 4-    Right Knee   Flexion: 4+  Extension: 4+    Left Ankle/Foot   Dorsiflexion: 4-  Plantar flexion: 3+  Inversion: 4-  Eversion: 4-  Great toe extension: 3+    Right Ankle/Foot   Dorsiflexion:  4+  Plantar flexion: 4+  Inversion: 4+  Eversion: 4+  Great toe extension: 4+    Muscle Activation     Additional Muscle Activation Details  Good TA activation with supine marching and functional transfers.    Tests     Additional Tests Details  DARRELL's: (+) L  SLR: neg R;  mild pain L > 80 degrees  Mild thoracic scoliosis.  Gillet's: (+) L  Lumbar distraction and single leg distraction: (+) L w/ decrease in pain.    Ambulation     Observational Gait   Walking speed within functional limits.        See Exercise, Manual, and Modality Logs for complete treatment.     Patient Education:    Assessment & Plan     Assessment  Assessment details: Patient responds well to HEP and lumbar distraction: has combination of SI and lower lumbar pain: will try independent HEP and use of home traction unit to manage symptoms. Advised to contact MD if complications occur. STG met; LTG's not fully met.    Goals  Plan Goals: Goals  Plan Goals: SHORT TERM GOALS: Time for Goal Achievement: 4 weeks    1.  Patient to be compliant w/ the HEP and tolerate progression. MET                    2.  Pain level < 4/10 at worst with mentioned activities to improve function. partially met  3.  Increased lumbar AROM to by 25% in all planes to allow for increased ease with sit-stand transfers and functional activities. MET  4. 25% improvement subjectively with function and activities. MET    LONG TERM GOALS: Time for Goal Achievement: D/C  1.  Outcome survey to show significant improvement - not met  2.  Pain level < 1-2/10 with all listed activities to return to normal. Not met  3.  Lumbar AROM to WFL to allow for return to household, work & recreational activities w/o increase in symptoms. Not met  4.  (B) LE and lower abdominal strength to 5/5 to allow for pushing, pulling and activities to occur without pain (driving, sitting, household  & Job requirements). Not met  5. Voice readiness for d/c. met              Other - discharge            Timed:          Manual Therapy:    10     mins  13976;     Therapeutic Exercise:   25    mins  74732;     Neuromuscular Supriya:        mins  39742;    Therapeutic Activity:          mins  45274;     Gait Training:           mins  07147;     Ultrasound:     10     mins  70875;    Ionto                                   mins   73774  Self Care                           mins   21143  Canalith Repos                   mins  4209  Aquatic                               mins 55967    Un-Timed:  Electrical Stimulation:         mins  60791 ( );  Dry Needling         mins self-pay  Traction          mins 35324  Low Eval          Mins  71075  Mod Eval          Mins  71195  High Eval                            Mins  51161  Re-Eval                               mins  13048    Timed Treatment:   45   mins   Total Treatment:     45   mins    Thomas Gagnon PT

## 2020-01-17 NOTE — PROGRESS NOTES
Re-Assessment / Re-Certification        Patient: Yvette Garvin   : 1955  Diagnosis/ICD-10 Code:  Degeneration of lumbar intervertebral disc [M51.36]  Referring practitioner: Brennan Bianchi MD  Date of Initial Visit: Type: THERAPY  Noted: 2019  Today's Date: 2020  Patient seen for 10 sessions      Subjective:   Yvette Garvin reports: ***  Subjective Questionnaire: {PT subjective questionnaires:71640}  Clinical Progress: {UNCHANGED, IMPROVED, WORSE:74018}  Home Program Compliance: {YES/NA/NO:16024}  Treatment has included: {PT interventions:82885}    Subjective   Objective   Assessment/Plan  Progress toward previous goals: {all/partially/not met:09173}    New Goals  Short-term goals (STG): ***  Long-term goals (LTG): ***      Recommendations: {Reassess plan:74765}  Timeframe: { timeframe:6301458189}  Prognosis to achieve goals: {GOOD/FAIR/POOR:26819}    PT Signature: Thomas Gagnon PT      Based upon review of the patient's progress and continued therapy plan, it is my medical opinion that Yvette Garvin should continue physical therapy treatment at [facility]  [address] [place] [address] [place] [address] [phone].    Signature: __________________________________  Brennan Bianchi MD    Timed:         Manual Therapy:    ***     mins  84047;     Therapeutic Exercise:    ***     mins  29901;     Neuromuscular Supriya:    ***    mins  45757;    Therapeutic Activity:     ***     mins  50261;     Gait Training:      ***     mins  52417;     Ultrasound:     ***     mins  06011;    Ionto                               ***    mins   83856  Self Care                       ***     mins   04134  Aquatic                          ***     mins 16060      Un-Timed:  Electrical Stimulation:    ***     mins  14553 ( );  Dry Needling     ***     mins self-pay  Traction     ***     mins 81260  Low Eval     ***     Mins  34468  Mod Eval     ***     Mins   93638  High Eval                       ***     Mins  49624  Re-Eval                           ***    mins  85682      Timed Treatment:   ***   mins   Total Treatment:     ***   mins

## 2020-01-18 NOTE — PROGRESS NOTES
Discharge Summary  Discharge Summary from Physical Therapy Report    Patient: Yvette Garvin   : 1955  Diagnosis/ICD-10 Code:  Degeneration of lumbar intervertebral disc [M51.36]    Dates  PT visit: 19 to 20  Number of Visits: 10    Discharge Status of Patient: See progress Note dated 20    Goals: Partially Met    Discharge Plan: Continue with current home exercise program as instructed    Comments - see above note    Date of Discharge 20        Thomas Gagnon, PT  Physical Therapist

## 2020-02-11 ENCOUNTER — OFFICE VISIT (OUTPATIENT)
Dept: FAMILY MEDICINE CLINIC | Facility: CLINIC | Age: 65
End: 2020-02-11

## 2020-02-11 ENCOUNTER — LAB (OUTPATIENT)
Dept: FAMILY MEDICINE CLINIC | Facility: CLINIC | Age: 65
End: 2020-02-11

## 2020-02-11 VITALS
WEIGHT: 173 LBS | SYSTOLIC BLOOD PRESSURE: 147 MMHG | DIASTOLIC BLOOD PRESSURE: 84 MMHG | OXYGEN SATURATION: 100 % | HEART RATE: 53 BPM | BODY MASS INDEX: 27.8 KG/M2 | HEIGHT: 66 IN

## 2020-02-11 DIAGNOSIS — Z11.59 NEED FOR HEPATITIS C SCREENING TEST: ICD-10-CM

## 2020-02-11 DIAGNOSIS — M79.18 MUSCULOSKELETAL PAIN: ICD-10-CM

## 2020-02-11 DIAGNOSIS — E78.2 MIXED HYPERLIPIDEMIA: ICD-10-CM

## 2020-02-11 DIAGNOSIS — E78.2 MIXED HYPERLIPIDEMIA: Primary | ICD-10-CM

## 2020-02-11 PROCEDURE — 80061 LIPID PANEL: CPT | Performed by: FAMILY MEDICINE

## 2020-02-11 PROCEDURE — 99213 OFFICE O/P EST LOW 20 MIN: CPT | Performed by: FAMILY MEDICINE

## 2020-02-11 PROCEDURE — 80053 COMPREHEN METABOLIC PANEL: CPT | Performed by: FAMILY MEDICINE

## 2020-02-11 PROCEDURE — 36415 COLL VENOUS BLD VENIPUNCTURE: CPT | Performed by: FAMILY MEDICINE

## 2020-02-11 PROCEDURE — 86803 HEPATITIS C AB TEST: CPT | Performed by: FAMILY MEDICINE

## 2020-02-11 NOTE — PROGRESS NOTES
Subjective   Yvette Garvin is a 64 y.o. female.     She comes in for follow up on chol  Also c/o left shoulder, neck and back pain  She just finished PT for her hip  She took 1/2 baclofen that she had left over and it helped  She said PT recommended she get a traction device and she did  Helped her hip but now she hurts in the other locations       The following portions of the patient's history were reviewed and updated as appropriate: allergies, current medications, past family history, past medical history, past social history, past surgical history and problem list.  Past Medical History:   Diagnosis Date   • Arthritis    • Back pain     lower back pain   • GERD (gastroesophageal reflux disease)    • Hip pain    • Hyperlipidemia    • Left sided chest pain     cardiac R/O     Past Surgical History:   Procedure Laterality Date   • APPENDECTOMY     • CARDIAC ABLATION      for slow heart rate   • CHOLECYSTECTOMY     • COLONOSCOPY     • ENDOSCOPY     • HIP SURGERY Left     SI joint    • HYSTERECTOMY       Family History   Problem Relation Age of Onset   • Cancer Mother    • Heart disease Father    • Cancer Brother      Social History     Socioeconomic History   • Marital status:      Spouse name: Not on file   • Number of children: Not on file   • Years of education: Not on file   • Highest education level: Not on file   Tobacco Use   • Smoking status: Current Every Day Smoker     Packs/day: 0.75     Types: Cigarettes   • Smokeless tobacco: Never Used   Substance and Sexual Activity   • Alcohol use: No     Frequency: Never   • Drug use: No   • Sexual activity: Defer         Current Outpatient Medications:   •  aspirin 81 MG EC tablet, Take 81 mg by mouth Daily., Disp: , Rfl:   •  atorvastatin (LIPITOR) 20 MG tablet, TAKE 1/2 TABLET BY MOUTH EVERY NIGHT AT BEDTIME, Disp: 45 tablet, Rfl: 2  •  celecoxib (CeleBREX) 200 MG capsule, Take 200 mg by mouth Every Evening., Disp: , Rfl:   •  dexlansoprazole  "(DEXILANT) 60 MG capsule, Take 60 mg by mouth Daily., Disp: , Rfl:   •  diclofenac (FLECTOR) 1.3 % patch patch, Apply 1 patch topically to the appropriate area as directed 2 (Two) Times a Day., Disp: , Rfl:   •  gabapentin (NEURONTIN) 100 MG capsule, Take 100 mg by mouth Every Night., Disp: , Rfl:   •  Glucosamine-Chondroit-Vit C-Mn (GLUCOSAMINE 1500 COMPLEX PO), Take  by mouth., Disp: , Rfl:   •  HYDROcodone-acetaminophen (NORCO) 7.5-325 MG per tablet, Take 1 tablet by mouth 3 (Three) Times a Day., Disp: , Rfl:   •  Omega-3 Fatty Acids (FISH OIL) 1000 MG capsule capsule, Take 1,200 mg by mouth every night at bedtime., Disp: , Rfl:   •  Probiotic Product (PROBIOTIC-10 PO), Take  by mouth., Disp: , Rfl:   •  vitamin B-12 (CYANOCOBALAMIN) 1000 MCG tablet, Take 1,000 mcg by mouth Daily., Disp: , Rfl:     Review of Systems   Constitutional: Negative for diaphoresis, fatigue, fever, unexpected weight gain and unexpected weight loss.   Respiratory: Negative for cough, chest tightness and shortness of breath.    Cardiovascular: Negative for chest pain, palpitations and leg swelling.   Gastrointestinal: Negative for nausea and vomiting.   Musculoskeletal: Positive for arthralgias.   Neurological: Negative for dizziness, syncope and headache.     /84 (BP Location: Left arm, Patient Position: Sitting, Cuff Size: Large Adult)   Pulse 53   Ht 167.6 cm (66\")   Wt 78.5 kg (173 lb)   SpO2 100%   BMI 27.92 kg/m²       Objective   Physical Exam   Constitutional: She appears well-developed and well-nourished. No distress. She appears overweight.   HENT:   Head: Normocephalic and atraumatic.   Neck: Neck supple. No JVD present. No thyromegaly present.   Cardiovascular: Normal rate, regular rhythm, normal heart sounds and intact distal pulses. Exam reveals no gallop and no friction rub.   No murmur heard.  Pulmonary/Chest: Effort normal and breath sounds normal. No respiratory distress. She has no wheezes. She has no rales. "   Musculoskeletal: She exhibits no edema.        Left shoulder: She exhibits tenderness (across the post aspect) and pain. She exhibits no swelling, no effusion and normal strength.        Cervical back: She exhibits tenderness (muscle; not vertebral).        Thoracic back: She exhibits tenderness (muscle not vertebral).        Lumbar back: She exhibits tenderness (muscle not vertebral).   Lymphadenopathy:     She has no cervical adenopathy.   Neurological: She is alert.   Skin: Skin is warm and dry.   Nursing note and vitals reviewed.        Assessment/Plan   Problems Addressed this Visit        Cardiovascular and Mediastinum    Hyperlipidemia - Primary    Relevant Orders    Comprehensive Metabolic Panel    Lipid Panel      Other Visit Diagnoses     Need for hepatitis C screening test        Relevant Orders    Hepatitis C antibody    Musculoskeletal pain                Will order labs for chol and hep C screening  Counseled on the need for weight loss  Suspect her pain is related to muscle strain related to using the traction device - recommended warm compresses and prn baclofen

## 2020-02-12 LAB
ALBUMIN SERPL-MCNC: 4.9 G/DL (ref 3.5–5.2)
ALBUMIN/GLOB SERPL: 1.8 G/DL
ALP SERPL-CCNC: 56 U/L (ref 39–117)
ALT SERPL W P-5'-P-CCNC: 16 U/L (ref 1–33)
ANION GAP SERPL CALCULATED.3IONS-SCNC: 16.1 MMOL/L (ref 5–15)
AST SERPL-CCNC: 22 U/L (ref 1–32)
BILIRUB SERPL-MCNC: 0.6 MG/DL (ref 0.2–1.2)
BUN BLD-MCNC: 18 MG/DL (ref 8–23)
BUN/CREAT SERPL: 24.3 (ref 7–25)
CALCIUM SPEC-SCNC: 9.8 MG/DL (ref 8.6–10.5)
CHLORIDE SERPL-SCNC: 100 MMOL/L (ref 98–107)
CHOLEST SERPL-MCNC: 157 MG/DL (ref 0–200)
CO2 SERPL-SCNC: 22.9 MMOL/L (ref 22–29)
CREAT BLD-MCNC: 0.74 MG/DL (ref 0.57–1)
GFR SERPL CREATININE-BSD FRML MDRD: 80 ML/MIN/1.73
GLOBULIN UR ELPH-MCNC: 2.7 GM/DL
GLUCOSE BLD-MCNC: 67 MG/DL (ref 65–99)
HCV AB SER DONR QL: NORMAL
HDLC SERPL-MCNC: 56 MG/DL (ref 40–60)
LDLC SERPL CALC-MCNC: 85 MG/DL (ref 0–100)
LDLC/HDLC SERPL: 1.52 {RATIO}
POTASSIUM BLD-SCNC: 3.8 MMOL/L (ref 3.5–5.2)
PROT SERPL-MCNC: 7.6 G/DL (ref 6–8.5)
SODIUM BLD-SCNC: 139 MMOL/L (ref 136–145)
TRIGL SERPL-MCNC: 80 MG/DL (ref 0–150)
VLDLC SERPL-MCNC: 16 MG/DL (ref 5–40)

## 2020-05-27 RX ORDER — ATORVASTATIN CALCIUM 20 MG/1
TABLET, FILM COATED ORAL
Qty: 45 TABLET | Refills: 2 | Status: SHIPPED | OUTPATIENT
Start: 2020-05-27 | End: 2021-02-13

## 2020-07-02 ENCOUNTER — TELEPHONE (OUTPATIENT)
Dept: FAMILY MEDICINE CLINIC | Facility: CLINIC | Age: 65
End: 2020-07-02

## 2020-07-02 RX ORDER — AMOXICILLIN AND CLAVULANATE POTASSIUM 875; 125 MG/1; MG/1
1 TABLET, FILM COATED ORAL 2 TIMES DAILY
Qty: 20 TABLET | Refills: 0 | Status: SHIPPED | OUTPATIENT
Start: 2020-07-02 | End: 2020-08-10

## 2020-07-19 NOTE — PROGRESS NOTES
"Physical Therapy Daily Progress Note    VISIT#: 7/10 in POC.      Subjective   Yvette Garvin reports: Pt. Pain earlier in day 4/10 but had to  a bag of dog food and now a 8/10 L low back and into buttock.       Objective   PMHX:  s/p L SI fusion 2/14/19    See Exercise, Manual, and Modality Logs for complete treatment.     Patient Education:    Assessment/Plan :  Pt. Responding well to interventions, pain decreased after treatment.     Progress per Plan of Care\"  Monitor response and continue as tolerated.             Timed:         Manual Therapy: 15        mins  37040;     Therapeutic Exercise:     20    mins  12839;     Neuromuscular Supriya:        mins  04791;    Therapeutic Activity:          mins  81955;     Gait Training:           mins  22964;     Ultrasound:    10      mins  32720;    Ionto                                   mins   17030  Self Care                            mins   03717  Canalith Repos                   mins  4209  Aquatic                               mins 27820    Un-Timed:  Electrical Stimulation:         mins  52614 ( );  Dry Needling          mins self-pay  Traction          mins 53727  Low Eval          Mins  55632  Mod Eval          Mins  26167  High Eval                            Mins  48672  Re-Eval                               mins  78320    Timed Treatment: 45     mins   Total Treatment:   45     mins    Margie Townsend PTA  "
No cyanosis, clubbing or edema

## 2020-08-10 ENCOUNTER — OFFICE VISIT (OUTPATIENT)
Dept: FAMILY MEDICINE CLINIC | Facility: CLINIC | Age: 65
End: 2020-08-10

## 2020-08-10 VITALS
BODY MASS INDEX: 27.16 KG/M2 | DIASTOLIC BLOOD PRESSURE: 89 MMHG | WEIGHT: 169 LBS | SYSTOLIC BLOOD PRESSURE: 151 MMHG | HEART RATE: 68 BPM | HEIGHT: 66 IN | TEMPERATURE: 97.5 F | OXYGEN SATURATION: 99 %

## 2020-08-10 DIAGNOSIS — I10 ESSENTIAL HYPERTENSION: Primary | ICD-10-CM

## 2020-08-10 PROCEDURE — 99213 OFFICE O/P EST LOW 20 MIN: CPT | Performed by: NURSE PRACTITIONER

## 2020-08-10 RX ORDER — LISINOPRIL 5 MG/1
5 TABLET ORAL DAILY
Qty: 30 TABLET | Refills: 2 | Status: SHIPPED | OUTPATIENT
Start: 2020-08-10 | End: 2020-10-30 | Stop reason: SDUPTHER

## 2020-08-10 NOTE — PROGRESS NOTES
Subjective   Yvette Garvin is a 65 y.o. female.       HPI   Pt. Is here today with concerns of high blood pressure.  She has had HTN in the past and was on meds for awhile.  She lost weight and was able to come off meds.  She has maintained BP with healthy diet and exercise.  She admits with the pandemic she hasn't been eating as well; maybe not as active.  She has been anxious about the pandemic.  She started checking her BP at home several weeks ago and noted it running 140-160/80-90's.  She was at pain management   last Wednesday and it was 174/90; /89 today.     Pt. denies any CP; palpitations; SOA; dizziness; headache; trouble with vision.      The following portions of the patient's history were reviewed and updated as appropriate: allergies, current medications, past family history, past medical history, past social history, past surgical history and problem list.    Review of Systems   Constitutional: Negative for activity change, appetite change, chills, diaphoresis, fatigue, fever, unexpected weight gain and unexpected weight loss.   Eyes: Negative for blurred vision, double vision and visual disturbance.   Respiratory: Negative for cough, chest tightness, shortness of breath and wheezing.    Cardiovascular: Negative for chest pain, palpitations and leg swelling.   Gastrointestinal: Negative for abdominal distention, abdominal pain, blood in stool, constipation, diarrhea, nausea, vomiting and indigestion.   Genitourinary: Negative for dysuria, flank pain, frequency, hematuria and urgency.   Musculoskeletal: Negative for arthralgias, back pain and myalgias.   Neurological: Negative for dizziness, weakness, light-headedness, headache and confusion.   Psychiatric/Behavioral: Negative for depressed mood. The patient is not nervous/anxious.        Objective   Physical Exam   Constitutional: She is oriented to person, place, and time. She appears well-developed and well-nourished. No distress.   HENT:    Head: Normocephalic and atraumatic.   Neck: Normal range of motion. Neck supple. No JVD present. No thyromegaly present.   Cardiovascular: Normal rate, regular rhythm, normal heart sounds and intact distal pulses.   No murmur heard.  Pulmonary/Chest: Effort normal and breath sounds normal. No respiratory distress. She has no wheezes. She exhibits no tenderness.   Abdominal: Soft. Bowel sounds are normal. She exhibits no distension and no mass. There is no tenderness. There is no rebound and no guarding.   Musculoskeletal: She exhibits no edema.   Neurological: She is alert and oriented to person, place, and time.   Skin: Skin is warm and dry. Capillary refill takes less than 2 seconds. No rash noted. No erythema.   Psychiatric: She has a normal mood and affect.   Vitals reviewed.        Assessment/Plan   Yvette was seen today for hypertension and anxiety.    Diagnoses and all orders for this visit:    Essential hypertension  Comments:  Started lisinopril 5 mg daily.   Cont. work on healthy diet.  Monitor BP at home; bring readings to f/u on 8/26.   Orders:  -     lisinopril (PRINIVIL,ZESTRIL) 5 MG tablet; Take 1 tablet by mouth Daily.

## 2020-08-10 NOTE — PATIENT INSTRUCTIONS
"Keep follow up appointment with Dr. Dockery on 8/26.        DASH Eating Plan  DASH stands for \"Dietary Approaches to Stop Hypertension.\" The DASH eating plan is a healthy eating plan that has been shown to reduce high blood pressure (hypertension). It may also reduce your risk for type 2 diabetes, heart disease, and stroke. The DASH eating plan may also help with weight loss.  What are tips for following this plan?    General guidelines  · Avoid eating more than 2,300 mg (milligrams) of salt (sodium) a day. If you have hypertension, you may need to reduce your sodium intake to 1,500 mg a day.  · Limit alcohol intake to no more than 1 drink a day for nonpregnant women and 2 drinks a day for men. One drink equals 12 oz of beer, 5 oz of wine, or 1½ oz of hard liquor.  · Work with your health care provider to maintain a healthy body weight or to lose weight. Ask what an ideal weight is for you.  · Get at least 30 minutes of exercise that causes your heart to beat faster (aerobic exercise) most days of the week. Activities may include walking, swimming, or biking.  · Work with your health care provider or diet and nutrition specialist (dietitian) to adjust your eating plan to your individual calorie needs.  Reading food labels    · Check food labels for the amount of sodium per serving. Choose foods with less than 5 percent of the Daily Value of sodium. Generally, foods with less than 300 mg of sodium per serving fit into this eating plan.  · To find whole grains, look for the word \"whole\" as the first word in the ingredient list.  Shopping  · Buy products labeled as \"low-sodium\" or \"no salt added.\"  · Buy fresh foods. Avoid canned foods and premade or frozen meals.  Cooking  · Avoid adding salt when cooking. Use salt-free seasonings or herbs instead of table salt or sea salt. Check with your health care provider or pharmacist before using salt substitutes.  · Do not rivera foods. Cook foods using healthy methods such as " baking, boiling, grilling, and broiling instead.  · Cook with heart-healthy oils, such as olive, canola, soybean, or sunflower oil.  Meal planning  · Eat a balanced diet that includes:  ? 5 or more servings of fruits and vegetables each day. At each meal, try to fill half of your plate with fruits and vegetables.  ? Up to 6-8 servings of whole grains each day.  ? Less than 6 oz of lean meat, poultry, or fish each day. A 3-oz serving of meat is about the same size as a deck of cards. One egg equals 1 oz.  ? 2 servings of low-fat dairy each day.  ? A serving of nuts, seeds, or beans 5 times each week.  ? Heart-healthy fats. Healthy fats called Omega-3 fatty acids are found in foods such as flaxseeds and coldwater fish, like sardines, salmon, and mackerel.  · Limit how much you eat of the following:  ? Canned or prepackaged foods.  ? Food that is high in trans fat, such as fried foods.  ? Food that is high in saturated fat, such as fatty meat.  ? Sweets, desserts, sugary drinks, and other foods with added sugar.  ? Full-fat dairy products.  · Do not salt foods before eating.  · Try to eat at least 2 vegetarian meals each week.  · Eat more home-cooked food and less restaurant, buffet, and fast food.  · When eating at a restaurant, ask that your food be prepared with less salt or no salt, if possible.  What foods are recommended?  The items listed may not be a complete list. Talk with your dietitian about what dietary choices are best for you.  Grains  Whole-grain or whole-wheat bread. Whole-grain or whole-wheat pasta. Brown rice. Oatmeal. Quinoa. Bulgur. Whole-grain and low-sodium cereals. Tiffani bread. Low-fat, low-sodium crackers. Whole-wheat flour tortillas.  Vegetables  Fresh or frozen vegetables (raw, steamed, roasted, or grilled). Low-sodium or reduced-sodium tomato and vegetable juice. Low-sodium or reduced-sodium tomato sauce and tomato paste. Low-sodium or reduced-sodium canned vegetables.  Fruits  All fresh,  dried, or frozen fruit. Canned fruit in natural juice (without added sugar).  Meat and other protein foods  Skinless chicken or turkey. Ground chicken or turkey. Pork with fat trimmed off. Fish and seafood. Egg whites. Dried beans, peas, or lentils. Unsalted nuts, nut butters, and seeds. Unsalted canned beans. Lean cuts of beef with fat trimmed off. Low-sodium, lean deli meat.  Dairy  Low-fat (1%) or fat-free (skim) milk. Fat-free, low-fat, or reduced-fat cheeses. Nonfat, low-sodium ricotta or cottage cheese. Low-fat or nonfat yogurt. Low-fat, low-sodium cheese.  Fats and oils  Soft margarine without trans fats. Vegetable oil. Low-fat, reduced-fat, or light mayonnaise and salad dressings (reduced-sodium). Canola, safflower, olive, soybean, and sunflower oils. Avocado.  Seasoning and other foods  Herbs. Spices. Seasoning mixes without salt. Unsalted popcorn and pretzels. Fat-free sweets.  What foods are not recommended?  The items listed may not be a complete list. Talk with your dietitian about what dietary choices are best for you.  Grains  Baked goods made with fat, such as croissants, muffins, or some breads. Dry pasta or rice meal packs.  Vegetables  Creamed or fried vegetables. Vegetables in a cheese sauce. Regular canned vegetables (not low-sodium or reduced-sodium). Regular canned tomato sauce and paste (not low-sodium or reduced-sodium). Regular tomato and vegetable juice (not low-sodium or reduced-sodium). Pickles. Olives.  Fruits  Canned fruit in a light or heavy syrup. Fried fruit. Fruit in cream or butter sauce.  Meat and other protein foods  Fatty cuts of meat. Ribs. Fried meat. Torres. Sausage. Bologna and other processed lunch meats. Salami. Fatback. Hotdogs. Bratwurst. Salted nuts and seeds. Canned beans with added salt. Canned or smoked fish. Whole eggs or egg yolks. Chicken or turkey with skin.  Dairy  Whole or 2% milk, cream, and half-and-half. Whole or full-fat cream cheese. Whole-fat or sweetened  yogurt. Full-fat cheese. Nondairy creamers. Whipped toppings. Processed cheese and cheese spreads.  Fats and oils  Butter. Stick margarine. Lard. Shortening. Ghee. Torres fat. Tropical oils, such as coconut, palm kernel, or palm oil.  Seasoning and other foods  Salted popcorn and pretzels. Onion salt, garlic salt, seasoned salt, table salt, and sea salt. Worcestershire sauce. Tartar sauce. Barbecue sauce. Teriyaki sauce. Soy sauce, including reduced-sodium. Steak sauce. Canned and packaged gravies. Fish sauce. Oyster sauce. Cocktail sauce. Horseradish that you find on the shelf. Ketchup. Mustard. Meat flavorings and tenderizers. Bouillon cubes. Hot sauce and Tabasco sauce. Premade or packaged marinades. Premade or packaged taco seasonings. Relishes. Regular salad dressings.  Where to find more information:  · National Heart, Lung, and Blood [place]: www.nhlbi.nih.gov  · American Heart Association: www.heart.org  Summary  · The DASH eating plan is a healthy eating plan that has been shown to reduce high blood pressure (hypertension). It may also reduce your risk for type 2 diabetes, heart disease, and stroke.  · With the DASH eating plan, you should limit salt (sodium) intake to 2,300 mg a day. If you have hypertension, you may need to reduce your sodium intake to 1,500 mg a day.  · When on the DASH eating plan, aim to eat more fresh fruits and vegetables, whole grains, lean proteins, low-fat dairy, and heart-healthy fats.  · Work with your health care provider or diet and nutrition specialist (dietitian) to adjust your eating plan to your individual calorie needs.  This information is not intended to replace advice given to you by your health care provider. Make sure you discuss any questions you have with your health care provider.  Document Released: 12/06/2012 Document Revised: 11/30/2018 Document Reviewed: 12/11/2017  [employer] Patient Education © 2020 [employer]

## 2020-08-26 ENCOUNTER — LAB (OUTPATIENT)
Dept: FAMILY MEDICINE CLINIC | Facility: CLINIC | Age: 65
End: 2020-08-26

## 2020-08-26 ENCOUNTER — OFFICE VISIT (OUTPATIENT)
Dept: FAMILY MEDICINE CLINIC | Facility: CLINIC | Age: 65
End: 2020-08-26

## 2020-08-26 VITALS
TEMPERATURE: 98.2 F | HEIGHT: 66 IN | BODY MASS INDEX: 27.32 KG/M2 | OXYGEN SATURATION: 97 % | SYSTOLIC BLOOD PRESSURE: 121 MMHG | HEART RATE: 72 BPM | WEIGHT: 170 LBS | DIASTOLIC BLOOD PRESSURE: 83 MMHG

## 2020-08-26 DIAGNOSIS — Z23 NEED FOR VACCINATION: ICD-10-CM

## 2020-08-26 DIAGNOSIS — Z00.00 PREVENTATIVE HEALTH CARE: Primary | ICD-10-CM

## 2020-08-26 DIAGNOSIS — L98.9 SKIN LESION: ICD-10-CM

## 2020-08-26 DIAGNOSIS — Z00.00 PREVENTATIVE HEALTH CARE: ICD-10-CM

## 2020-08-26 LAB
ALBUMIN SERPL-MCNC: 4.9 G/DL (ref 3.5–5.2)
ALBUMIN/GLOB SERPL: 1.8 G/DL
ALP SERPL-CCNC: 59 U/L (ref 39–117)
ALT SERPL W P-5'-P-CCNC: 17 U/L (ref 1–33)
ANION GAP SERPL CALCULATED.3IONS-SCNC: 10.6 MMOL/L (ref 5–15)
AST SERPL-CCNC: 20 U/L (ref 1–32)
BILIRUB BLD-MCNC: NEGATIVE MG/DL
BILIRUB SERPL-MCNC: 0.5 MG/DL (ref 0–1.2)
BUN SERPL-MCNC: 20 MG/DL (ref 8–23)
BUN/CREAT SERPL: 25.3 (ref 7–25)
CALCIUM SPEC-SCNC: 9.7 MG/DL (ref 8.6–10.5)
CHLORIDE SERPL-SCNC: 102 MMOL/L (ref 98–107)
CHOLEST SERPL-MCNC: 172 MG/DL (ref 0–200)
CHROMATIN AB SERPL-ACNC: <10 IU/ML (ref 0–14)
CLARITY, POC: CLEAR
CO2 SERPL-SCNC: 24.4 MMOL/L (ref 22–29)
COLOR UR: YELLOW
CREAT SERPL-MCNC: 0.79 MG/DL (ref 0.57–1)
DEPRECATED RDW RBC AUTO: 40.7 FL (ref 37–54)
EOSINOPHIL # BLD MANUAL: 0.1 10*3/MM3 (ref 0–0.4)
EOSINOPHIL NFR BLD MANUAL: 1 % (ref 0.3–6.2)
ERYTHROCYTE [DISTWIDTH] IN BLOOD BY AUTOMATED COUNT: 12.5 % (ref 12.3–15.4)
ERYTHROCYTE [SEDIMENTATION RATE] IN BLOOD: 5 MM/HR (ref 0–30)
GFR SERPL CREATININE-BSD FRML MDRD: 74 ML/MIN/1.73
GLOBULIN UR ELPH-MCNC: 2.7 GM/DL
GLUCOSE SERPL-MCNC: 78 MG/DL (ref 65–99)
GLUCOSE UR STRIP-MCNC: NEGATIVE MG/DL
HCT VFR BLD AUTO: 39.8 % (ref 34–46.6)
HDLC SERPL-MCNC: 56 MG/DL (ref 40–60)
HGB BLD-MCNC: 14.1 G/DL (ref 12–15.9)
KETONES UR QL: ABNORMAL
LDLC SERPL CALC-MCNC: 101 MG/DL (ref 0–100)
LDLC/HDLC SERPL: 1.8 {RATIO}
LEUKOCYTE EST, POC: NEGATIVE
LYMPHOCYTES # BLD MANUAL: 2.72 10*3/MM3 (ref 0.7–3.1)
LYMPHOCYTES NFR BLD MANUAL: 26 % (ref 19.6–45.3)
LYMPHOCYTES NFR BLD MANUAL: 6 % (ref 5–12)
MCH RBC QN AUTO: 31.8 PG (ref 26.6–33)
MCHC RBC AUTO-ENTMCNC: 35.4 G/DL (ref 31.5–35.7)
MCV RBC AUTO: 89.6 FL (ref 79–97)
MONOCYTES # BLD AUTO: 0.63 10*3/MM3 (ref 0.1–0.9)
NEUTROPHILS # BLD AUTO: 7.02 10*3/MM3 (ref 1.7–7)
NEUTROPHILS NFR BLD MANUAL: 67 % (ref 42.7–76)
NITRITE UR-MCNC: NEGATIVE MG/ML
PH UR: 6 [PH] (ref 5–8)
PLAT MORPH BLD: NORMAL
PLATELET # BLD AUTO: 218 10*3/MM3 (ref 140–450)
PMV BLD AUTO: 13.4 FL (ref 6–12)
POIKILOCYTOSIS BLD QL SMEAR: ABNORMAL
POTASSIUM SERPL-SCNC: 4.2 MMOL/L (ref 3.5–5.2)
PROT SERPL-MCNC: 7.6 G/DL (ref 6–8.5)
PROT UR STRIP-MCNC: NEGATIVE MG/DL
RBC # BLD AUTO: 4.44 10*6/MM3 (ref 3.77–5.28)
RBC # UR STRIP: NEGATIVE /UL
SODIUM SERPL-SCNC: 137 MMOL/L (ref 136–145)
SP GR UR: 1.02 (ref 1–1.03)
TRIGL SERPL-MCNC: 76 MG/DL (ref 0–150)
UROBILINOGEN UR QL: NORMAL
VLDLC SERPL-MCNC: 15.2 MG/DL (ref 5–40)
WBC # BLD AUTO: 10.48 10*3/MM3 (ref 3.4–10.8)
WBC MORPH BLD: NORMAL

## 2020-08-26 PROCEDURE — 85025 COMPLETE CBC W/AUTO DIFF WBC: CPT | Performed by: FAMILY MEDICINE

## 2020-08-26 PROCEDURE — 90732 PPSV23 VACC 2 YRS+ SUBQ/IM: CPT | Performed by: FAMILY MEDICINE

## 2020-08-26 PROCEDURE — 81003 URINALYSIS AUTO W/O SCOPE: CPT | Performed by: FAMILY MEDICINE

## 2020-08-26 PROCEDURE — 85652 RBC SED RATE AUTOMATED: CPT | Performed by: FAMILY MEDICINE

## 2020-08-26 PROCEDURE — 86038 ANTINUCLEAR ANTIBODIES: CPT | Performed by: FAMILY MEDICINE

## 2020-08-26 PROCEDURE — 80061 LIPID PANEL: CPT | Performed by: FAMILY MEDICINE

## 2020-08-26 PROCEDURE — 86431 RHEUMATOID FACTOR QUANT: CPT | Performed by: FAMILY MEDICINE

## 2020-08-26 PROCEDURE — 80053 COMPREHEN METABOLIC PANEL: CPT | Performed by: FAMILY MEDICINE

## 2020-08-26 PROCEDURE — 90471 IMMUNIZATION ADMIN: CPT | Performed by: FAMILY MEDICINE

## 2020-08-26 PROCEDURE — 36415 COLL VENOUS BLD VENIPUNCTURE: CPT | Performed by: FAMILY MEDICINE

## 2020-08-26 PROCEDURE — 85007 BL SMEAR W/DIFF WBC COUNT: CPT

## 2020-08-26 PROCEDURE — 99396 PREV VISIT EST AGE 40-64: CPT | Performed by: FAMILY MEDICINE

## 2020-08-26 RX ORDER — FAMOTIDINE 40 MG/1
1 TABLET, FILM COATED ORAL 2 TIMES DAILY
COMMUNITY
Start: 2020-08-17

## 2020-08-26 NOTE — PROGRESS NOTES
Subjective   Yvette Garvin is a 65 y.o. female.     Here for cpe  Continues to smoke  Had flu shot Sat - [facility]  Arthritis is worse  Using flector patch on left piriformis  Using otc gels  Takes celebrex once a day  Neck ab  No joint swelling  Mother had RA  Wants testing for autoimmune arthritis  [facility] to therapy [facility] [place] [place] gets traction and uls heat   Is seen at pain management  Has had trigger point injections  Concerned about lesion on lat aspect of right elbow -present for 6 mo and never heals  Has appt to see GI in Oct and will schedule colonoscopy  Sees gyn later this year       The following portions of the patient's history were reviewed and updated as appropriate: allergies, current medications, past family history, past medical history, past social history, past surgical history and problem list.  Past Medical History:   Diagnosis Date   • Arthritis    • Back pain     lower back pain   • GERD (gastroesophageal reflux disease)    • Hip pain    • Hyperlipidemia    • Left sided chest pain     cardiac R/O     Past Surgical History:   Procedure Laterality Date   • APPENDECTOMY     • CARDIAC ABLATION      for slow heart rate   • CHOLECYSTECTOMY     • COLONOSCOPY     • ENDOSCOPY     • HIP SURGERY Left     SI joint    • HYSTERECTOMY       Family History   Problem Relation Age of Onset   • Cancer Mother    • Heart disease Father    • Cancer Brother      Social History     Socioeconomic History   • Marital status:      Spouse name: Not on file   • Number of children: Not on file   • Years of education: Not on file   • Highest education level: Not on file   Tobacco Use   • Smoking status: Current Every Day Smoker     Packs/day: 0.75     Types: Cigarettes   • Smokeless tobacco: Never Used   Substance and Sexual Activity   • Alcohol use: No     Frequency: Never   • Drug use: No   • Sexual activity: Defer         Current Outpatient Medications:   •  aspirin 81 MG EC tablet, Take 81 mg  by mouth Daily., Disp: , Rfl:   •  atorvastatin (LIPITOR) 20 MG tablet, TAKE 1/2 TABLET BY MOUTH EVERY NIGHT AT BEDTIME, Disp: 45 tablet, Rfl: 2  •  celecoxib (CeleBREX) 200 MG capsule, Take 200 mg by mouth Every Evening., Disp: , Rfl:   •  dexlansoprazole (DEXILANT) 60 MG capsule, Take 60 mg by mouth Daily., Disp: , Rfl:   •  diclofenac (FLECTOR) 1.3 % patch patch, Apply 1 patch topically to the appropriate area as directed 2 (Two) Times a Day., Disp: , Rfl:   •  famotidine (PEPCID) 40 MG tablet, Take 1 tablet by mouth 2 (two) times a day., Disp: , Rfl:   •  gabapentin (NEURONTIN) 100 MG capsule, Take 100 mg by mouth Every Night., Disp: , Rfl:   •  Glucosamine-Chondroit-Vit C-Mn (GLUCOSAMINE 1500 COMPLEX PO), Take  by mouth., Disp: , Rfl:   •  HYDROcodone-acetaminophen (NORCO) 7.5-325 MG per tablet, Take 1 tablet by mouth 3 (Three) Times a Day., Disp: , Rfl:   •  lisinopril (PRINIVIL,ZESTRIL) 5 MG tablet, Take 1 tablet by mouth Daily., Disp: 30 tablet, Rfl: 2  •  Omega-3 Fatty Acids (FISH OIL) 1000 MG capsule capsule, Take 1,200 mg by mouth every night at bedtime., Disp: , Rfl:   •  Probiotic Product (PROBIOTIC-10 PO), Take  by mouth., Disp: , Rfl:   •  vitamin B-12 (CYANOCOBALAMIN) 1000 MCG tablet, Take 1,000 mcg by mouth Daily., Disp: , Rfl:     Review of Systems   Constitutional: Negative.    HENT: Negative.    Eyes: Negative.    Respiratory: Negative.    Cardiovascular: Negative.    Gastrointestinal: Negative.    Endocrine: Negative.    Genitourinary: Negative.    Musculoskeletal: Positive for arthralgias, gait problem, neck pain and neck stiffness.   Skin: Positive for skin lesions.   Allergic/Immunologic: Negative.    Neurological: Positive for numbness. Negative for dizziness, syncope, facial asymmetry, light-headedness and headache.   Hematological: Negative.    Psychiatric/Behavioral: Negative.      /83 (BP Location: Left arm, Patient Position: Sitting, Cuff Size: Adult)   Pulse 72   Temp 98.2 °F  "(36.8 °C) (Temporal)   Ht 167.6 cm (66\")   Wt 77.1 kg (170 lb)   SpO2 97%   Breastfeeding No   BMI 27.44 kg/m²       Objective   Physical Exam   Constitutional: She appears well-developed and well-nourished. No distress. She appears overweight.   HENT:   Head: Normocephalic and atraumatic.   Right Ear: Tympanic membrane, external ear and ear canal normal.   Left Ear: Tympanic membrane, external ear and ear canal normal.   Nose: Nose normal.   Mouth/Throat: Oropharynx is clear and moist.   Eyes: Pupils are equal, round, and reactive to light. Conjunctivae, EOM and lids are normal.   Neck: Neck supple. No JVD present. No thyromegaly present.   Trapezius spasm bilaterally   Cardiovascular: Normal rate, regular rhythm, normal heart sounds, intact distal pulses and normal pulses. Exam reveals no gallop and no friction rub.   No murmur heard.  Pulmonary/Chest: Effort normal and breath sounds normal. No respiratory distress. She has no wheezes. She has no rales.   Abdominal: Soft. Bowel sounds are normal. She exhibits no mass. There is no hepatosplenomegaly. There is no tenderness. No hernia.   Musculoskeletal: She exhibits no edema.   No joint swelling   Lymphadenopathy:     She has no cervical adenopathy.   Neurological: She is alert. She has normal strength.   Skin: Skin is warm and dry. Turgor is normal. No rash noted.        Psychiatric: She has a normal mood and affect.   Nursing note and vitals reviewed.  ds  Brief Urine Lab Results  (Last result in the past 365 days)      Color   Clarity   Blood   Leuk Est   Nitrite   Protein   CREAT   Urine HCG        08/26/20 1344 Yellow Clear Negative Negative Negative Negative                 Assessment/Plan   Problems Addressed this Visit     None      Visit Diagnoses     Preventative health care    -  Primary    Relevant Orders    POCT urinalysis dipstick, automated (Completed)    Comprehensive Metabolic Panel (Completed)    Lipid Panel (Completed)    CBC & Differential " (Completed)    CARLTON (Completed)    Sedimentation rate, automated (Completed)    Rheumatoid Factor, Quant (Completed)    Skin lesion        Relevant Orders    Ambulatory Referral to Dermatology    Need for vaccination        Relevant Orders    Pneumococcal Polysaccharide Vaccine 23-Valent (PPSV23) Greater Than or Equal To 1yo Subcutaneous / IM (Completed)          She was counseled on the need for weight loss and smoking cessation  She has had her flu shot  She has appt with GI and gyn later this year  She will get pneumovax today  She need tdap and hep A (wants these) and will get at future visits  She has never had chicken pox and will not get shingrix  To derm for eval of the skin lesion on lat aspect of her right prox forearm

## 2020-08-26 NOTE — PATIENT INSTRUCTIONS
Keep working to stop smoking  Keep working to lose weight through healthy eating and exercise.  Can take 1 extra strength tylenol 3 times a day if needed  Your blood type is O NEGATIVE

## 2020-08-28 LAB — ANA SER QL: NEGATIVE

## 2020-10-12 ENCOUNTER — TELEPHONE (OUTPATIENT)
Dept: FAMILY MEDICINE CLINIC | Facility: CLINIC | Age: 65
End: 2020-10-12

## 2020-10-12 RX ORDER — AMOXICILLIN AND CLAVULANATE POTASSIUM 875; 125 MG/1; MG/1
1 TABLET, FILM COATED ORAL 2 TIMES DAILY
Qty: 20 TABLET | Refills: 0 | Status: SHIPPED | OUTPATIENT
Start: 2020-10-12 | End: 2021-02-04 | Stop reason: SDUPTHER

## 2020-10-12 NOTE — TELEPHONE ENCOUNTER
Pt thinks she has a sinus infection. She has been taking Claritin and it has not helped. Unable to take mucinex because it causes her to have rapid heart rate.

## 2020-10-28 ENCOUNTER — OFFICE (AMBULATORY)
Dept: URBAN - METROPOLITAN AREA CLINIC 64 | Facility: CLINIC | Age: 65
End: 2020-10-28

## 2020-10-28 VITALS
SYSTOLIC BLOOD PRESSURE: 122 MMHG | HEIGHT: 66 IN | HEART RATE: 60 BPM | DIASTOLIC BLOOD PRESSURE: 78 MMHG | WEIGHT: 173 LBS

## 2020-10-28 DIAGNOSIS — K21.9 GASTRO-ESOPHAGEAL REFLUX DISEASE WITHOUT ESOPHAGITIS: ICD-10-CM

## 2020-10-28 PROCEDURE — 99213 OFFICE O/P EST LOW 20 MIN: CPT | Performed by: INTERNAL MEDICINE

## 2020-10-28 RX ORDER — FAMOTIDINE 40 MG/1
TABLET, FILM COATED ORAL
Qty: 60 | Refills: 0 | Status: ACTIVE

## 2020-10-28 RX ORDER — DEXLANSOPRAZOLE 60 MG/1
CAPSULE, DELAYED RELEASE ORAL
Qty: 90 | Refills: 3 | Status: ACTIVE

## 2020-10-30 DIAGNOSIS — I10 ESSENTIAL HYPERTENSION: ICD-10-CM

## 2020-10-31 RX ORDER — LISINOPRIL 5 MG/1
5 TABLET ORAL DAILY
Qty: 90 TABLET | Refills: 1 | Status: SHIPPED | OUTPATIENT
Start: 2020-10-31 | End: 2021-02-26

## 2021-02-04 ENCOUNTER — TELEPHONE (OUTPATIENT)
Dept: FAMILY MEDICINE CLINIC | Facility: CLINIC | Age: 66
End: 2021-02-04

## 2021-02-04 RX ORDER — AMOXICILLIN AND CLAVULANATE POTASSIUM 875; 125 MG/1; MG/1
1 TABLET, FILM COATED ORAL 2 TIMES DAILY
Qty: 20 TABLET | Refills: 0 | Status: SHIPPED | OUTPATIENT
Start: 2021-02-04 | End: 2021-02-26

## 2021-02-04 NOTE — TELEPHONE ENCOUNTER
Pt is taking lisinopril 5mg qd. Reports that her bp has been elevated on/off. She wants to know if lisinopril should be increased. She has an apt 2/26/21.   Also, she says that she has been fighting a sinus infection x1mo. Asking if she needs antibx.

## 2021-02-04 NOTE — TELEPHONE ENCOUNTER
Have her start taking 2 lisinipril 5mg pills to equal 10mg  If she is about to run out let me know and I can adjust the rx    I will send an antibiotic to her pharmacy

## 2021-02-13 RX ORDER — ATORVASTATIN CALCIUM 20 MG/1
TABLET, FILM COATED ORAL
Qty: 45 TABLET | Refills: 1 | Status: SHIPPED | OUTPATIENT
Start: 2021-02-13 | End: 2021-08-12

## 2021-02-26 ENCOUNTER — OFFICE VISIT (OUTPATIENT)
Dept: FAMILY MEDICINE CLINIC | Facility: CLINIC | Age: 66
End: 2021-02-26

## 2021-02-26 VITALS
WEIGHT: 174 LBS | OXYGEN SATURATION: 97 % | DIASTOLIC BLOOD PRESSURE: 85 MMHG | HEART RATE: 71 BPM | HEIGHT: 66 IN | TEMPERATURE: 97.8 F | SYSTOLIC BLOOD PRESSURE: 134 MMHG | BODY MASS INDEX: 27.97 KG/M2

## 2021-02-26 DIAGNOSIS — I10 ESSENTIAL HYPERTENSION: ICD-10-CM

## 2021-02-26 DIAGNOSIS — E78.2 MIXED HYPERLIPIDEMIA: ICD-10-CM

## 2021-02-26 DIAGNOSIS — R07.9 CHEST PAIN, UNSPECIFIED TYPE: Primary | ICD-10-CM

## 2021-02-26 PROCEDURE — 99213 OFFICE O/P EST LOW 20 MIN: CPT | Performed by: FAMILY MEDICINE

## 2021-02-26 RX ORDER — LOSARTAN POTASSIUM 25 MG/1
25 TABLET ORAL DAILY
Qty: 30 TABLET | Refills: 1 | Status: SHIPPED | OUTPATIENT
Start: 2021-02-26 | End: 2021-03-03 | Stop reason: SINTOL

## 2021-02-26 RX ORDER — CELECOXIB 100 MG/1
100 CAPSULE ORAL 2 TIMES DAILY
COMMUNITY
Start: 2021-02-13

## 2021-02-26 NOTE — PROGRESS NOTES
Subjective   Yvette Garvin is a 65 y.o. female.     Here for follow up on bp and chol  UTD on flu and pneumonia vaccines  Colonoscopy was done 9/2012 at [facility] (will get the report)  Lisinopril was increased to 10mg but developed a cough  Has been on 5 or 7.5 with no cough  On 5mg her bp will run 120-140  Left sided cp  Occurred when she took the higher dose of lisinopril   When it hurts chest is tender to touch  Occurred when she was on the antibiotic  No pain for over a week now that she has finished the antibiotic  She says that when she had it it was a constant pain  She is seeing a specialist for her back pain and is scheduled for another steroid injection next week  She is scheduled to get her first COVID vaccine soon and is worried about the steroid vaccine interfering with her vaccine         The following portions of the patient's history were reviewed and updated as appropriate: allergies, current medications, past family history, past medical history, past social history, past surgical history and problem list.  Past Medical History:   Diagnosis Date   • Arthritis    • Back pain     lower back pain   • GERD (gastroesophageal reflux disease)    • Hip pain    • Hyperlipidemia    • Left sided chest pain     cardiac R/O     Past Surgical History:   Procedure Laterality Date   • APPENDECTOMY     • CARDIAC ABLATION      for slow heart rate   • CHOLECYSTECTOMY     • COLONOSCOPY     • ENDOSCOPY     • HIP SURGERY Left     SI joint    • HYSTERECTOMY       Family History   Problem Relation Age of Onset   • Cancer Mother    • Heart disease Father    • Cancer Brother      Social History     Socioeconomic History   • Marital status:      Spouse name: Not on file   • Number of children: Not on file   • Years of education: Not on file   • Highest education level: Not on file   Tobacco Use   • Smoking status: Current Every Day Smoker     Packs/day: 0.75     Types: Cigarettes   • Smokeless tobacco: Never Used    Substance and Sexual Activity   • Alcohol use: No     Frequency: Never   • Drug use: No   • Sexual activity: Defer         Current Outpatient Medications:   •  aspirin 81 MG EC tablet, Take 81 mg by mouth Daily., Disp: , Rfl:   •  atorvastatin (LIPITOR) 20 MG tablet, TAKE 1/2 TABLET BY MOUTH EVERY NIGHT AT BEDTIME, Disp: 45 tablet, Rfl: 1  •  celecoxib (CeleBREX) 100 MG capsule, Take 100 mg by mouth 2 (Two) Times a Day., Disp: , Rfl:   •  dexlansoprazole (DEXILANT) 60 MG capsule, Take 60 mg by mouth Daily., Disp: , Rfl:   •  diclofenac (FLECTOR) 1.3 % patch patch, Apply 1 patch topically to the appropriate area as directed 2 (Two) Times a Day., Disp: , Rfl:   •  famotidine (PEPCID) 40 MG tablet, Take 1 tablet by mouth 2 (two) times a day., Disp: , Rfl:   •  gabapentin (NEURONTIN) 100 MG capsule, Take 100 mg by mouth Every Night., Disp: , Rfl:   •  Glucosamine-Chondroit-Vit C-Mn (GLUCOSAMINE 1500 COMPLEX PO), Take  by mouth., Disp: , Rfl:   •  HYDROcodone-acetaminophen (NORCO) 7.5-325 MG per tablet, Take 1 tablet by mouth 3 (Three) Times a Day., Disp: , Rfl:   •  Omega-3 Fatty Acids (FISH OIL) 1000 MG capsule capsule, Take 1,200 mg by mouth every night at bedtime., Disp: , Rfl:   •  Probiotic Product (PROBIOTIC-10 PO), Take  by mouth., Disp: , Rfl:   •  vitamin B-12 (CYANOCOBALAMIN) 1000 MCG tablet, Take 1,000 mcg by mouth Daily., Disp: , Rfl:   •  losartan (Cozaar) 25 MG tablet, Take 1 tablet by mouth Daily., Disp: 30 tablet, Rfl: 1    Review of Systems   Constitutional: Negative.    Respiratory: Positive for cough. Negative for shortness of breath and wheezing.    Cardiovascular: Positive for chest pain. Negative for palpitations and leg swelling.   Gastrointestinal: Negative for nausea and vomiting.   Neurological: Negative for dizziness, syncope, light-headedness and headache.     /85 (BP Location: Left arm, Patient Position: Sitting, Cuff Size: Adult)   Pulse 71   Temp 97.8 °F (36.6 °C) (Temporal)    "Ht 167.6 cm (66\")   Wt 78.9 kg (174 lb)   SpO2 97%   Breastfeeding No   BMI 28.08 kg/m²       Objective   Physical Exam  Vitals signs and nursing note reviewed.   Constitutional:       General: She is not in acute distress.     Appearance: Normal appearance. She is well-developed and overweight.   HENT:      Head: Normocephalic and atraumatic.   Neck:      Musculoskeletal: Neck supple.      Thyroid: No thyromegaly.   Cardiovascular:      Rate and Rhythm: Normal rate and regular rhythm.      Heart sounds: Normal heart sounds. No murmur. No friction rub. No gallop.    Pulmonary:      Effort: Pulmonary effort is normal. No respiratory distress.      Breath sounds: Normal breath sounds. No wheezing or rales.   Chest:      Chest wall: Tenderness (over left side of her ant chest wall) present.   Musculoskeletal:      Right lower leg: No edema.      Left lower leg: No edema.   Lymphadenopathy:      Cervical: No cervical adenopathy.   Skin:     General: Skin is warm and dry.   Neurological:      Mental Status: She is alert.   Psychiatric:         Behavior: Behavior is cooperative.       Lab Results   Component Value Date    GLUCOSE 78 08/26/2020    BUN 20 08/26/2020    CREATININE 0.79 08/26/2020    EGFRIFNONA 74 08/26/2020    BCR 25.3 (H) 08/26/2020    K 4.2 08/26/2020    CO2 24.4 08/26/2020    CALCIUM 9.7 08/26/2020    ALBUMIN 4.90 08/26/2020    LABIL2 1.5 08/31/2018    AST 20 08/26/2020    ALT 17 08/26/2020     Lab Results   Component Value Date    CHOL 172 08/26/2020    TRIG 76 08/26/2020    HDL 56 08/26/2020     (H) 08/26/2020         Assessment/Plan   Problems Addressed this Visit        Cardiac and Vasculature    Chest pain - Primary    Relevant Orders    XR Chest 2 View    Hyperlipidemia    Hypertension    Relevant Medications    losartan (Cozaar) 25 MG tablet      Diagnoses       Codes Comments    Chest pain, unspecified type    -  Primary ICD-10-CM: R07.9  ICD-9-CM: 786.50     Essential hypertension     " ICD-10-CM: I10  ICD-9-CM: 401.9     Mixed hyperlipidemia     ICD-10-CM: E78.2  ICD-9-CM: 272.2           Will stop her lisinopril and try her on losartan 25mg  If she begins feeling lightheaded, she will be seen   Counseled again on smoking cessation  Suspect her cp is secondary to gerd  Counseled on weight loss  Will see her back in 3 months

## 2021-02-26 NOTE — PATIENT INSTRUCTIONS
Warm compresses to chest  Cancel the steroid shot so you can get COVID vaccine  If the pain increases in severity go to the ER  Stop the 5 mg lisinopril

## 2021-03-03 ENCOUNTER — TELEPHONE (OUTPATIENT)
Dept: FAMILY MEDICINE CLINIC | Facility: CLINIC | Age: 66
End: 2021-03-03

## 2021-03-03 RX ORDER — LISINOPRIL 5 MG/1
5 TABLET ORAL DAILY
Qty: 90 TABLET | Refills: 3 | Status: SHIPPED | OUTPATIENT
Start: 2021-03-03 | End: 2021-05-28

## 2021-03-03 NOTE — TELEPHONE ENCOUNTER
Pt called to let you know that she is unable to tolerate the losartan 25mg. She would like to be switched back to lisinopril 5mg once daily

## 2021-05-28 ENCOUNTER — OFFICE VISIT (OUTPATIENT)
Dept: FAMILY MEDICINE CLINIC | Facility: CLINIC | Age: 66
End: 2021-05-28

## 2021-05-28 VITALS
SYSTOLIC BLOOD PRESSURE: 144 MMHG | HEIGHT: 66 IN | TEMPERATURE: 97.8 F | WEIGHT: 167 LBS | OXYGEN SATURATION: 99 % | BODY MASS INDEX: 26.84 KG/M2 | HEART RATE: 60 BPM | DIASTOLIC BLOOD PRESSURE: 87 MMHG

## 2021-05-28 DIAGNOSIS — I10 ESSENTIAL HYPERTENSION: Primary | ICD-10-CM

## 2021-05-28 PROBLEM — G57.03 PIRIFORMIS SYNDROME OF BOTH SIDES: Status: ACTIVE | Noted: 2020-09-16

## 2021-05-28 PROBLEM — R25.2 SPASM: Status: ACTIVE | Noted: 2017-08-17

## 2021-05-28 PROBLEM — M53.3 DISORDER OF SACRUM: Status: ACTIVE | Noted: 2020-09-16

## 2021-05-28 PROBLEM — G57.01 PIRIFORMIS SYNDROME OF RIGHT SIDE: Status: ACTIVE | Noted: 2020-12-09

## 2021-05-28 PROBLEM — M70.70 ISCHIAL BURSITIS: Status: ACTIVE | Noted: 2019-01-09

## 2021-05-28 PROBLEM — G57.02 PIRIFORMIS SYNDROME OF LEFT SIDE: Status: ACTIVE | Noted: 2018-08-08

## 2021-05-28 PROBLEM — M60.9 MYOSITIS: Status: ACTIVE | Noted: 2018-04-18

## 2021-05-28 PROCEDURE — 99213 OFFICE O/P EST LOW 20 MIN: CPT | Performed by: FAMILY MEDICINE

## 2021-05-28 RX ORDER — AMLODIPINE BESYLATE 5 MG/1
5 TABLET ORAL DAILY
Qty: 90 TABLET | Refills: 3 | Status: SHIPPED | OUTPATIENT
Start: 2021-05-28 | End: 2021-06-15

## 2021-05-28 RX ORDER — LOSARTAN POTASSIUM 25 MG/1
1 TABLET ORAL DAILY
COMMUNITY
End: 2021-05-28

## 2021-05-28 NOTE — PROGRESS NOTES
Subjective   Yvette Garvin is a 66 y.o. female.     She comes in for follow-up on her blood pressure after lisinopril was stopped and she was started on 25 mg of losartan  She did  Not feel well on losartan  She went back to 5 mg of lisinopril with no cough  10mg lisinopril triggers a cough  Dropped 7 pounds since Feb  Dog  2 mo ago  Which has stressed her  This is the first time in 40 years w/o a nunez retriever in her home         The following portions of the patient's history were reviewed and updated as appropriate: allergies, current medications, past family history, past medical history, past social history, past surgical history, and problem list.  Past Medical History:   Diagnosis Date   • Arthritis    • Back pain     lower back pain   • GERD (gastroesophageal reflux disease)    • Hip pain    • Hyperlipidemia    • Left sided chest pain     cardiac R/O     Past Surgical History:   Procedure Laterality Date   • APPENDECTOMY     • CARDIAC ABLATION      for slow heart rate   • CHOLECYSTECTOMY     • COLONOSCOPY     • ENDOSCOPY     • HIP SURGERY Left     SI joint    • HYSTERECTOMY       Family History   Problem Relation Age of Onset   • Cancer Mother    • Heart disease Father    • Cancer Brother      Social History     Socioeconomic History   • Marital status:      Spouse name: Not on file   • Number of children: Not on file   • Years of education: Not on file   • Highest education level: Not on file   Tobacco Use   • Smoking status: Current Every Day Smoker     Packs/day: 0.75     Types: Cigarettes   • Smokeless tobacco: Never Used   Vaping Use   • Vaping Use: Never used   Substance and Sexual Activity   • Alcohol use: No   • Drug use: No   • Sexual activity: Defer         Current Outpatient Medications:   •  aspirin 81 MG EC tablet, Take 81 mg by mouth Daily., Disp: , Rfl:   •  atorvastatin (LIPITOR) 20 MG tablet, TAKE 1/2 TABLET BY MOUTH EVERY NIGHT AT BEDTIME, Disp: 45 tablet, Rfl: 1  •   "celecoxib (CeleBREX) 100 MG capsule, Take 100 mg by mouth 2 (Two) Times a Day., Disp: , Rfl:   •  dexlansoprazole (DEXILANT) 60 MG capsule, Take 60 mg by mouth Daily., Disp: , Rfl:   •  diclofenac (FLECTOR) 1.3 % patch patch, Apply 1 patch topically to the appropriate area as directed 2 (Two) Times a Day., Disp: , Rfl:   •  famotidine (PEPCID) 40 MG tablet, Take 1 tablet by mouth 2 (two) times a day., Disp: , Rfl:   •  gabapentin (NEURONTIN) 100 MG capsule, Take 100 mg by mouth Every Night., Disp: , Rfl:   •  Glucosamine-Chondroit-Vit C-Mn (GLUCOSAMINE 1500 COMPLEX PO), Take  by mouth., Disp: , Rfl:   •  HYDROcodone-acetaminophen (NORCO) 7.5-325 MG per tablet, Take 1 tablet by mouth 3 (Three) Times a Day., Disp: , Rfl:   •  Omega-3 Fatty Acids (FISH OIL) 1000 MG capsule capsule, Take 1,200 mg by mouth every night at bedtime., Disp: , Rfl:   •  Probiotic Product (PROBIOTIC-10 PO), Take  by mouth., Disp: , Rfl:   •  vitamin B-12 (CYANOCOBALAMIN) 1000 MCG tablet, Take 1,000 mcg by mouth Daily., Disp: , Rfl:   •  amLODIPine (NORVASC) 5 MG tablet, Take 1 tablet by mouth Daily., Disp: 90 tablet, Rfl: 3    Review of Systems   Constitutional: Negative.    Respiratory: Negative.    Cardiovascular: Negative.    Allergic/Immunologic: Positive for environmental allergies.   Neurological: Negative for dizziness, syncope, light-headedness and headache.   Psychiatric/Behavioral: Positive for stress.     /87 (BP Location: Left arm, Patient Position: Sitting, Cuff Size: Adult)   Pulse 60   Temp 97.8 °F (36.6 °C) (Temporal)   Ht 167.6 cm (66\")   Wt 75.8 kg (167 lb)   SpO2 99%   Breastfeeding No   BMI 26.95 kg/m²       Objective   Physical Exam  Vitals and nursing note reviewed.   Constitutional:       General: She is not in acute distress.     Appearance: Normal appearance. She is well-developed, well-groomed and overweight.   HENT:      Head: Normocephalic and atraumatic.      Right Ear: Tympanic membrane and ear canal " normal.      Left Ear: Tympanic membrane and ear canal normal.      Nose: Nose normal.      Mouth/Throat:      Mouth: Mucous membranes are moist.      Pharynx: Oropharynx is clear.   Neck:      Thyroid: No thyromegaly.   Cardiovascular:      Rate and Rhythm: Normal rate and regular rhythm.      Heart sounds: Normal heart sounds. No murmur heard.   No friction rub. No gallop.    Pulmonary:      Effort: Pulmonary effort is normal. No respiratory distress.      Breath sounds: Normal breath sounds. No wheezing or rales.   Musculoskeletal:      Cervical back: Neck supple.      Right lower leg: No edema.      Left lower leg: No edema.   Lymphadenopathy:      Cervical: No cervical adenopathy.   Skin:     General: Skin is warm and dry.   Neurological:      Mental Status: She is alert.   Psychiatric:         Behavior: Behavior is cooperative.           Assessment/Plan   Problems Addressed this Visit        Cardiac and Vasculature    Hypertension - Primary    Relevant Medications    amLODIPine (NORVASC) 5 MG tablet      Diagnoses       Codes Comments    Essential hypertension    -  Primary ICD-10-CM: I10  ICD-9-CM: 401.9           I will have her stop the lisinopril 5 mg  10 mg triggers a cough  I will start her on amlodipine 5 mg  Counseled her on lifestyle modifications  Encouraged her to call if she has any problems with the medication explained to her it may take 3 to 4 weeks before she sees full benefit from it  I will see her back in 3 months

## 2021-06-14 ENCOUNTER — TELEPHONE (OUTPATIENT)
Dept: FAMILY MEDICINE CLINIC | Facility: CLINIC | Age: 66
End: 2021-06-14

## 2021-06-14 NOTE — TELEPHONE ENCOUNTER
I'm confused - when I saw her at the end of May, I started her on amlodipine 5mg and she was supposed to take the entire pill  Has she called in after hours and been told to take 1/2 a pill?

## 2021-06-14 NOTE — TELEPHONE ENCOUNTER
PATIENT STATES: THAT SHE WOULD LIKE A CALL BACK TO SEE IF SHE NEEDS TO KEEP BREAKING amLODIPine (NORVASC) 5 MG tablet IN [facility]F OR GET 2,5 PLEASE ADVISE      PATIENT CAN BE REACHED ON: [phone]    PHARMACY PREFERRED:[facility] #[id number] [facility] [address] AT [facility] & [facility] - [phone]  - [phone]   [phone]

## 2021-06-15 RX ORDER — AMLODIPINE BESYLATE 2.5 MG/1
2.5 TABLET ORAL DAILY
Qty: 90 TABLET | Refills: 1 | Status: SHIPPED | OUTPATIENT
Start: 2021-06-15 | End: 2021-10-19

## 2021-06-15 NOTE — TELEPHONE ENCOUNTER
She tried taking the full tablet for a while but she says that it was making her not feel well. She started taking half a tab and has been taking bp daily. It is never over 130s/60-70s. She wants to know if she can get an rx for 2.5mg instead of cutting them.

## 2021-07-06 ENCOUNTER — TELEPHONE (OUTPATIENT)
Dept: FAMILY MEDICINE CLINIC | Facility: CLINIC | Age: 66
End: 2021-07-06

## 2021-07-07 ENCOUNTER — OFFICE VISIT (OUTPATIENT)
Dept: FAMILY MEDICINE CLINIC | Facility: CLINIC | Age: 66
End: 2021-07-07

## 2021-07-07 VITALS
HEIGHT: 66 IN | OXYGEN SATURATION: 96 % | TEMPERATURE: 98 F | SYSTOLIC BLOOD PRESSURE: 123 MMHG | BODY MASS INDEX: 26.84 KG/M2 | HEART RATE: 66 BPM | DIASTOLIC BLOOD PRESSURE: 79 MMHG | WEIGHT: 167 LBS

## 2021-07-07 DIAGNOSIS — H92.01 OTALGIA OF RIGHT EAR: ICD-10-CM

## 2021-07-07 DIAGNOSIS — L60.0 INGROWN TOENAIL OF BOTH FEET: Primary | ICD-10-CM

## 2021-07-07 DIAGNOSIS — J01.00 ACUTE MAXILLARY SINUSITIS, RECURRENCE NOT SPECIFIED: ICD-10-CM

## 2021-07-07 PROCEDURE — 99213 OFFICE O/P EST LOW 20 MIN: CPT | Performed by: FAMILY MEDICINE

## 2021-07-07 RX ORDER — DOXYCYCLINE 100 MG/1
100 TABLET ORAL 2 TIMES DAILY
Qty: 20 TABLET | Refills: 0 | Status: SHIPPED | OUTPATIENT
Start: 2021-07-07 | End: 2021-10-04 | Stop reason: SDUPTHER

## 2021-07-07 NOTE — PROGRESS NOTES
Subjective   Yvette Garvin is a 66 y.o. female.     She comes in with complaints of a sore throat and fever (100)  She has had the covid vaccines  she has also had ear fullness on the right and has been told she had fluid in her right ear  She is also dizzy  She is also concerned about an ingrown toenail  A family member used an ear candle over the weekend and removed quite a bit of wax  She has had a recurrent problem with a feeling of fullness in that right ear and was supposed to undergo an evaluation with her ENT but when she was told the potential side effects for the procedure she canceled       The following portions of the patient's history were reviewed and updated as appropriate: allergies, current medications, past family history, past medical history, past social history, past surgical history, and problem list.  Past Medical History:   Diagnosis Date   • Arthritis    • Back pain     lower back pain   • GERD (gastroesophageal reflux disease)    • Hip pain    • Hyperlipidemia    • Left sided chest pain     cardiac R/O     Past Surgical History:   Procedure Laterality Date   • APPENDECTOMY     • CARDIAC ABLATION      for slow heart rate   • CHOLECYSTECTOMY     • COLONOSCOPY     • ENDOSCOPY     • HIP SURGERY Left     SI joint    • HYSTERECTOMY       Family History   Problem Relation Age of Onset   • Cancer Mother    • Heart disease Father    • Cancer Brother      Social History     Socioeconomic History   • Marital status:      Spouse name: Not on file   • Number of children: Not on file   • Years of education: Not on file   • Highest education level: Not on file   Tobacco Use   • Smoking status: Current Every Day Smoker     Packs/day: 0.75     Types: Cigarettes   • Smokeless tobacco: Never Used   Vaping Use   • Vaping Use: Never used   Substance and Sexual Activity   • Alcohol use: No   • Drug use: No   • Sexual activity: Defer         Current Outpatient Medications:   •  amLODIPine (NORVASC)  "2.5 MG tablet, Take 1 tablet by mouth Daily., Disp: 90 tablet, Rfl: 1  •  aspirin 81 MG EC tablet, Take 81 mg by mouth Daily., Disp: , Rfl:   •  atorvastatin (LIPITOR) 20 MG tablet, TAKE 1/2 TABLET BY MOUTH EVERY NIGHT AT BEDTIME, Disp: 45 tablet, Rfl: 1  •  celecoxib (CeleBREX) 100 MG capsule, Take 100 mg by mouth 2 (Two) Times a Day., Disp: , Rfl:   •  dexlansoprazole (DEXILANT) 60 MG capsule, Take 60 mg by mouth Daily., Disp: , Rfl:   •  diclofenac (FLECTOR) 1.3 % patch patch, Apply 1 patch topically to the appropriate area as directed 2 (Two) Times a Day., Disp: , Rfl:   •  famotidine (PEPCID) 40 MG tablet, Take 1 tablet by mouth 2 (two) times a day., Disp: , Rfl:   •  gabapentin (NEURONTIN) 100 MG capsule, Take 100 mg by mouth Every Night., Disp: , Rfl:   •  Glucosamine-Chondroit-Vit C-Mn (GLUCOSAMINE 1500 COMPLEX PO), Take  by mouth., Disp: , Rfl:   •  HYDROcodone-acetaminophen (NORCO) 7.5-325 MG per tablet, Take 1 tablet by mouth 3 (Three) Times a Day., Disp: , Rfl:   •  Omega-3 Fatty Acids (FISH OIL) 1000 MG capsule capsule, Take 1,200 mg by mouth every night at bedtime., Disp: , Rfl:   •  Probiotic Product (PROBIOTIC-10 PO), Take  by mouth., Disp: , Rfl:   •  vitamin B-12 (CYANOCOBALAMIN) 1000 MCG tablet, Take 1,000 mcg by mouth Daily., Disp: , Rfl:   •  doxycycline (ADOXA) 100 MG tablet, Take 1 tablet by mouth 2 (Two) Times a Day., Disp: 20 tablet, Rfl: 0    Review of Systems   Constitutional: Positive for fever. Negative for chills, diaphoresis and fatigue.   HENT: Positive for congestion, ear pain, sinus pressure and sore throat. Negative for ear discharge.    Respiratory: Negative.    Cardiovascular: Negative.    Neurological: Positive for dizziness. Negative for light-headedness and headache.     /79 (BP Location: Left arm, Patient Position: Sitting, Cuff Size: Adult)   Pulse 66   Temp 98 °F (36.7 °C) (Temporal)   Ht 167.6 cm (66\")   Wt 75.8 kg (167 lb)   SpO2 96%   Breastfeeding No   BMI " 26.95 kg/m²       Objective   Physical Exam  Vitals and nursing note reviewed.   Constitutional:       General: She is not in acute distress.     Appearance: Normal appearance. She is well-developed, well-groomed and overweight.   HENT:      Head: Normocephalic and atraumatic.      Right Ear: Ear canal and external ear normal. Tympanic membrane is retracted.      Left Ear: Tympanic membrane, ear canal and external ear normal.      Nose:      Right Sinus: Maxillary sinus tenderness present. No frontal sinus tenderness.      Left Sinus: Maxillary sinus tenderness present. No frontal sinus tenderness.      Mouth/Throat:      Mouth: Mucous membranes are moist.      Pharynx: Oropharynx is clear.   Neck:      Thyroid: No thyromegaly.   Cardiovascular:      Rate and Rhythm: Normal rate and regular rhythm.      Heart sounds: Normal heart sounds. No murmur heard.   No friction rub. No gallop.    Pulmonary:      Effort: Pulmonary effort is normal. No respiratory distress.      Breath sounds: Normal breath sounds. No wheezing or rales.   Musculoskeletal:      Cervical back: Neck supple.      Comments: Bilateral ingrown toenails on both first toes but with no associated infection   Lymphadenopathy:      Cervical: No cervical adenopathy.   Skin:     General: Skin is warm and dry.      Findings: No rash.   Neurological:      Mental Status: She is alert.   Psychiatric:         Behavior: Behavior is cooperative.           Assessment/Plan   Problems Addressed this Visit        ENT    Sinusitis, acute      Other Visit Diagnoses     Ingrown toenail of both feet    -  Primary    Relevant Orders    Ambulatory Referral to Podiatry    Otalgia of right ear          Diagnoses       Codes Comments    Ingrown toenail of both feet    -  Primary ICD-10-CM: L60.0  ICD-9-CM: 703.0     Acute maxillary sinusitis, recurrence not specified     ICD-10-CM: J01.00  ICD-9-CM: 461.0     Otalgia of right ear     ICD-10-CM: H92.01  ICD-9-CM: 388.70          I will refer her to podiatry for further evaluation of her toenails  I will start her on some doxycycline for her sinus infection as she was on Augmentin in February  I have strongly encouraged her to get back into see ENT for further evaluation of her ear pain

## 2021-08-12 ENCOUNTER — OFFICE VISIT (OUTPATIENT)
Dept: PODIATRY | Facility: CLINIC | Age: 66
End: 2021-08-12

## 2021-08-12 VITALS
HEIGHT: 66 IN | SYSTOLIC BLOOD PRESSURE: 119 MMHG | RESPIRATION RATE: 20 BRPM | WEIGHT: 168 LBS | BODY MASS INDEX: 27 KG/M2 | DIASTOLIC BLOOD PRESSURE: 77 MMHG | HEART RATE: 67 BPM

## 2021-08-12 DIAGNOSIS — B35.1 ONYCHOMYCOSIS: Primary | ICD-10-CM

## 2021-08-12 DIAGNOSIS — L60.0 ONYCHOCRYPTOSIS: ICD-10-CM

## 2021-08-12 PROCEDURE — 99202 OFFICE O/P NEW SF 15 MIN: CPT | Performed by: PODIATRIST

## 2021-08-12 RX ORDER — DICLOFENAC EPOLAMINE 0.01 G/1
SYSTEM TOPICAL
COMMUNITY
Start: 2021-08-06 | End: 2022-02-22

## 2021-08-12 RX ORDER — ATORVASTATIN CALCIUM 20 MG/1
TABLET, FILM COATED ORAL
Qty: 45 TABLET | Refills: 1 | Status: SHIPPED | OUTPATIENT
Start: 2021-08-12 | End: 2022-03-28

## 2021-08-14 NOTE — PROGRESS NOTES
08/12/2021  Foot and Ankle Surgery - New Patient   Provider: Dr. Kunal Miller DPM  Location: [facility]    Subjective:  Yvette Garvin is a 66 y.o. female.     Chief Complaint   Patient presents with   • Left Foot - Ingrown Toenail   • Right Foot - Ingrown Toenail       HPI: Patient is a 66-year-old female that presents with intermittent issues involving her great toenails on both feet.  She states that the nails are thickened and cause mild discomfort at times with certain shoes and increased activity.  She has tried over-the-counter topicals without any significant improvement.  She does obtain relief with routine nail care.  No other issues or concerns with her feet    No Known Allergies    Past Medical History:   Diagnosis Date   • Arthritis    • Back pain     lower back pain   • GERD (gastroesophageal reflux disease)    • Hip pain    • Hyperlipidemia    • Left sided chest pain     cardiac R/O       Past Surgical History:   Procedure Laterality Date   • APPENDECTOMY     • CARDIAC ABLATION      for slow heart rate   • CHOLECYSTECTOMY     • COLONOSCOPY     • ENDOSCOPY     • HIP SURGERY Left     SI joint    • HYSTERECTOMY         Family History   Problem Relation Age of Onset   • Cancer Mother    • Heart disease Father    • Cancer Brother        Social History     Socioeconomic History   • Marital status:      Spouse name: Not on file   • Number of children: Not on file   • Years of education: Not on file   • Highest education level: Not on file   Tobacco Use   • Smoking status: Current Every Day Smoker     Packs/day: 0.75     Types: Cigarettes   • Smokeless tobacco: Never Used   Vaping Use   • Vaping Use: Never used   Substance and Sexual Activity   • Alcohol use: No   • Drug use: No   • Sexual activity: Defer        Current Outpatient Medications on File Prior to Visit   Medication Sig Dispense Refill   • amLODIPine (NORVASC) 2.5 MG tablet Take 1 tablet by mouth Daily. 90 tablet 1   •  aspirin 81 MG EC tablet Take 81 mg by mouth Daily.     • celecoxib (CeleBREX) 100 MG capsule Take 100 mg by mouth 2 (Two) Times a Day.     • dexlansoprazole (DEXILANT) 60 MG capsule Take 60 mg by mouth Daily.     • diclofenac (FLECTOR) 1.3 % patch patch Apply 1 patch topically to the appropriate area as directed 2 (Two) Times a Day.     • famotidine (PEPCID) 40 MG tablet Take 1 tablet by mouth 2 (two) times a day.     • gabapentin (NEURONTIN) 100 MG capsule Take 100 mg by mouth Every Night.     • Glucosamine-Chondroit-Vit C-Mn (GLUCOSAMINE 1500 COMPLEX PO) Take  by mouth.     • HYDROcodone-acetaminophen (NORCO) 7.5-325 MG per tablet Take 1 tablet by mouth 3 (Three) Times a Day.     • Omega-3 Fatty Acids (FISH OIL) 1000 MG capsule capsule Take 1,200 mg by mouth every night at bedtime.     • Probiotic Product (PROBIOTIC-10 PO) Take  by mouth.     • vitamin B-12 (CYANOCOBALAMIN) 1000 MCG tablet Take 1,000 mcg by mouth Daily.     • Diclofenac Epolamine (FLECTOR) 1.3 % patch patch      • doxycycline (ADOXA) 100 MG tablet Take 1 tablet by mouth 2 (Two) Times a Day. 20 tablet 0     No current facility-administered medications on file prior to visit.       Review of Systems:  General: Denies fever, chills, fatigue, and weakness.  Eyes: Denies vision loss, blurry vision, and excessive redness.  ENT: Denies hearing issues and difficulty swallowing.  Cardiovascular: Denies palpitations, chest pain, or syncopal episodes.  Respiratory: Denies shortness of breath, wheezing, and coughing.  GI: Denies abdominal pain, nausea, and vomiting.   : Denies frequency, hematuria, and urgency.  Musculoskeletal: Denies muscle cramps, joint pains, and stiffness.  Derm: + Great toenail complaints  Neuro: Denies headaches, numbness, loss of coordination, and tremors.  Psych: Denies anxiety and depression.  Endocrine: Denies temperature intolerance and changes in appetite.  Heme: Denies bleeding disorders or abnormal bruising.     Objective  "  /77 (BP Location: Right arm, Patient Position: Sitting, Cuff Size: Large Adult)   Pulse 67   Resp 20   Ht 167.6 cm (66\")   Wt 76.2 kg (168 lb)   BMI 27.12 kg/m²     Foot/Ankle Exam:       General:   Appearance: appears stated age and healthy    Orientation: AAOx3    Affect: appropriate    Gait: unimpaired      VASCULAR      Right Foot Vascularity   Normal vascular exam    Dorsalis pedis:  2+  Posterior tibial:  2+  Skin Temperature: warm    Edema Grading:  None  CFT:  < 3 seconds  Pedal Hair Growth:  Present  Varicosities: none       Left Foot Vascularity   Normal vascular exam    Dorsalis pedis:  2+  Posterior tibial:  2+  Skin Temperature: warm    Edema Grading:  None  CFT:  < 3 seconds  Pedal Hair Growth:  Present  Varicosities: none        NEUROLOGIC     Right Foot Neurologic   Light touch sensation:  Normal  Hot/Cold sensation: normal    Achilles reflex:  2+     Left Foot Neurologic   Light touch sensation:  Normal  Hot/cold sensation: normal    Achilles reflex:  2+     MUSCULOSKELETAL      Right Foot Musculoskeletal   Ecchymosis:  None  Tenderness: none    Arch:  Normal     Left Foot Musculoskeletal   Ecchymosis:  None  Tenderness: none    Arch:  Normal     MUSCLE STRENGTH     Right Foot Muscle Strength   Normal strength    Foot dorsiflexion:  5  Foot plantar flexion:  5  Foot inversion:  5  Foot eversion:  5     Left Foot Muscle Strength   Normal strength    Foot dorsiflexion:  5  Foot plantar flexion:  5  Foot inversion:  5  Foot eversion:  5     DERMATOLOGIC     Right Foot Dermatologic   Skin: skin intact    Nails: dystrophic nails       Left Foot Dermatologic   Skin: skin intact    Nails: dystrophic nails        Right Foot Additional Comments Mildly incurvated borders involving the great toes.  No erythema, edema, or further concerns.      Assessment/Plan   Diagnoses and all orders for this visit:    1. Onychomycosis (Primary)    2. Onychocryptosis      Patient presents with issues involving " both great toenails.  I did explain the diagnosis and treatment options.  She does not appear to have any significant pain or limitation at this time.  We did discuss appropriate nail care and I have asked that she observe.  We did discuss definitive treatment options of partial versus total nail removal with and without chemical matrixectomy.  She would prefer to observe at this time.  She has opted to follow-up with me on an as-needed basis.    No orders of the defined types were placed in this encounter.       Note is dictated utilizing voice recognition software. Unfortunately this leads to occasional typographical errors. I apologize in advance if the situation occurs. If questions occur please do not hesitate to call our office.

## 2021-10-04 ENCOUNTER — TELEPHONE (OUTPATIENT)
Dept: FAMILY MEDICINE CLINIC | Facility: CLINIC | Age: 66
End: 2021-10-04

## 2021-10-04 RX ORDER — DOXYCYCLINE 100 MG/1
100 TABLET ORAL 2 TIMES DAILY
Qty: 20 TABLET | Refills: 0 | Status: SHIPPED | OUTPATIENT
Start: 2021-10-04 | End: 2022-02-22

## 2021-10-04 RX ORDER — DOXYCYCLINE 100 MG/1
100 TABLET ORAL 2 TIMES DAILY
Qty: 20 TABLET | Refills: 0 | Status: CANCELLED | OUTPATIENT
Start: 2021-10-04

## 2021-10-04 NOTE — TELEPHONE ENCOUNTER
.    Caller: Yvette Garvin    Relationship: Self      Medication requested (name and dosage): doxycycline (ADOXA) 100 MG tablet  Pharmacy where request should be sent:   [facility] #[id number] [place] [id number] AT [facility] [place] [address] & [facility] [address]  - [phone]  - [phone]   [phone]  Associate Signed OrdersPatient EstimateProvidersCurrent Interactions          Additional details provided by patient: PATIENT SAID HER SINUS INFECTION HAS RETURNED. SHE SAID SHE HAS BEEN DEALING WITH IT FOR 2 WEEKS  Best call back number: [phone]    Does the patient have less than a 3 day supply:  [x] Yes  [] No    Aguila Kebede Rep   10/04/21 15:09 EDT

## 2021-10-04 NOTE — TELEPHONE ENCOUNTER
I sent a refill on the rx   She needs to push fluids and rest  If she starts having any trouble breathing or chest pain, she needs to be seen in the ER immediately  She should also avoid public places

## 2021-10-19 RX ORDER — AMLODIPINE BESYLATE 2.5 MG/1
2.5 TABLET ORAL DAILY
Qty: 90 TABLET | Refills: 1 | Status: SHIPPED | OUTPATIENT
Start: 2021-10-19 | End: 2022-04-06

## 2021-11-12 ENCOUNTER — OFFICE (AMBULATORY)
Dept: URBAN - METROPOLITAN AREA CLINIC 64 | Facility: CLINIC | Age: 66
End: 2021-11-12

## 2021-11-12 VITALS
HEART RATE: 63 BPM | SYSTOLIC BLOOD PRESSURE: 130 MMHG | WEIGHT: 166 LBS | DIASTOLIC BLOOD PRESSURE: 86 MMHG | HEIGHT: 66 IN

## 2021-11-12 DIAGNOSIS — Z12.11 ENCOUNTER FOR SCREENING FOR MALIGNANT NEOPLASM OF COLON: ICD-10-CM

## 2021-11-12 DIAGNOSIS — K21.9 GASTRO-ESOPHAGEAL REFLUX DISEASE WITHOUT ESOPHAGITIS: ICD-10-CM

## 2021-11-12 PROCEDURE — 99213 OFFICE O/P EST LOW 20 MIN: CPT | Performed by: INTERNAL MEDICINE

## 2021-11-12 RX ORDER — DEXLANSOPRAZOLE 60 MG/1
CAPSULE, DELAYED RELEASE ORAL
Qty: 90 | Refills: 3 | Status: ACTIVE

## 2021-11-12 RX ORDER — FAMOTIDINE 40 MG/1
TABLET, FILM COATED ORAL
Qty: 60 | Refills: 0 | Status: ACTIVE

## 2022-02-22 ENCOUNTER — TELEPHONE (OUTPATIENT)
Dept: FAMILY MEDICINE CLINIC | Facility: CLINIC | Age: 67
End: 2022-02-22

## 2022-02-22 ENCOUNTER — LAB (OUTPATIENT)
Dept: FAMILY MEDICINE CLINIC | Facility: CLINIC | Age: 67
End: 2022-02-22

## 2022-02-22 ENCOUNTER — OFFICE VISIT (OUTPATIENT)
Dept: FAMILY MEDICINE CLINIC | Facility: CLINIC | Age: 67
End: 2022-02-22

## 2022-02-22 VITALS
WEIGHT: 169 LBS | HEIGHT: 66 IN | DIASTOLIC BLOOD PRESSURE: 73 MMHG | BODY MASS INDEX: 27.16 KG/M2 | HEART RATE: 68 BPM | SYSTOLIC BLOOD PRESSURE: 110 MMHG | TEMPERATURE: 98 F | OXYGEN SATURATION: 100 %

## 2022-02-22 DIAGNOSIS — R60.0 PEDAL EDEMA: ICD-10-CM

## 2022-02-22 DIAGNOSIS — Z78.0 POSTMENOPAUSAL STATUS: ICD-10-CM

## 2022-02-22 DIAGNOSIS — E78.2 MIXED HYPERLIPIDEMIA: Primary | ICD-10-CM

## 2022-02-22 DIAGNOSIS — S46.911A MUSCLE STRAIN OF RIGHT UPPER ARM, INITIAL ENCOUNTER: ICD-10-CM

## 2022-02-22 DIAGNOSIS — I10 PRIMARY HYPERTENSION: ICD-10-CM

## 2022-02-22 DIAGNOSIS — Z12.31 BREAST CANCER SCREENING BY MAMMOGRAM: ICD-10-CM

## 2022-02-22 DIAGNOSIS — E78.2 MIXED HYPERLIPIDEMIA: ICD-10-CM

## 2022-02-22 LAB
ALBUMIN SERPL-MCNC: 4.7 G/DL (ref 3.5–5.2)
ALBUMIN/GLOB SERPL: 1.7 G/DL
ALP SERPL-CCNC: 69 U/L (ref 39–117)
ALT SERPL W P-5'-P-CCNC: 19 U/L (ref 1–33)
ANION GAP SERPL CALCULATED.3IONS-SCNC: 12.5 MMOL/L (ref 5–15)
AST SERPL-CCNC: 21 U/L (ref 1–32)
BILIRUB SERPL-MCNC: 0.5 MG/DL (ref 0–1.2)
BUN SERPL-MCNC: 18 MG/DL (ref 8–23)
BUN/CREAT SERPL: 22.8 (ref 7–25)
CALCIUM SPEC-SCNC: 9.7 MG/DL (ref 8.6–10.5)
CHLORIDE SERPL-SCNC: 101 MMOL/L (ref 98–107)
CHOLEST SERPL-MCNC: 158 MG/DL (ref 0–200)
CO2 SERPL-SCNC: 23.5 MMOL/L (ref 22–29)
CREAT SERPL-MCNC: 0.79 MG/DL (ref 0.57–1)
GFR SERPL CREATININE-BSD FRML MDRD: 73 ML/MIN/1.73
GLOBULIN UR ELPH-MCNC: 2.7 GM/DL
GLUCOSE SERPL-MCNC: 75 MG/DL (ref 65–99)
HDLC SERPL-MCNC: 63 MG/DL (ref 40–60)
LDLC SERPL CALC-MCNC: 81 MG/DL (ref 0–100)
LDLC/HDLC SERPL: 1.27 {RATIO}
POTASSIUM SERPL-SCNC: 4.2 MMOL/L (ref 3.5–5.2)
PROT SERPL-MCNC: 7.4 G/DL (ref 6–8.5)
SODIUM SERPL-SCNC: 137 MMOL/L (ref 136–145)
TRIGL SERPL-MCNC: 74 MG/DL (ref 0–150)
VLDLC SERPL-MCNC: 14 MG/DL (ref 5–40)

## 2022-02-22 PROCEDURE — 36415 COLL VENOUS BLD VENIPUNCTURE: CPT | Performed by: FAMILY MEDICINE

## 2022-02-22 PROCEDURE — 80061 LIPID PANEL: CPT | Performed by: FAMILY MEDICINE

## 2022-02-22 PROCEDURE — 80053 COMPREHEN METABOLIC PANEL: CPT | Performed by: FAMILY MEDICINE

## 2022-02-22 PROCEDURE — 99214 OFFICE O/P EST MOD 30 MIN: CPT | Performed by: FAMILY MEDICINE

## 2022-02-22 RX ORDER — BACLOFEN 10 MG/1
TABLET ORAL
COMMUNITY

## 2022-02-22 NOTE — PROGRESS NOTES
Subjective   Yvette Garvin is a 66 y.o. female.     History of Present Illness     Radha Garvin presents today in follow-up on blood pressure and cholesterol. In addition, she is complaining of right lower extremity edema and right upper extremity pain. She is also requesting a mammogram and DEXA scan as her gynecologist has retired.    The patient's blood pressure is well-controlled on intake today. She is compliant with current medication regimen.     The patient has noticed edema of the right lower extremity following a day of work where she is on her feet all day. It is not present in the morning and presents a the day goes on. She has tried to roll her sock down to avoid pressure on the area.     The patient has right upper extremity pain and burning of 4 months duration. Initially, she noticed it when lying on her arm while sleeping at night. It worsened after getting a puppy and lifting him constantly. She denies inciting injury or trauma. The patient has been trying to wrap her arm which has improved her pain.     The patient is unsure when her last DEXA scan was obtained.     The following portions of the patient's history were reviewed and updated as appropriate: allergies, current medications, past family history, past medical history, past social history, past surgical history, and problem list.  Past Medical History:   Diagnosis Date   • Arthritis    • Back pain     lower back pain   • GERD (gastroesophageal reflux disease)    • Hip pain    • Hyperlipidemia    • Left sided chest pain     cardiac R/O     Past Surgical History:   Procedure Laterality Date   • APPENDECTOMY     • CARDIAC ABLATION      for slow heart rate   • CHOLECYSTECTOMY     • COLONOSCOPY     • ENDOSCOPY     • HIP SURGERY Left     SI joint    • HYSTERECTOMY       Family History   Problem Relation Age of Onset   • Cancer Mother    • Heart disease Father    • Cancer Brother      Social History     Socioeconomic History   • Marital  status:    Tobacco Use   • Smoking status: Current Every Day Smoker     Packs/day: 0.50     Types: Cigarettes   • Smokeless tobacco: Never Used   Vaping Use   • Vaping Use: Never used   Substance and Sexual Activity   • Alcohol use: No   • Drug use: No   • Sexual activity: Defer         Current Outpatient Medications:   •  amLODIPine (NORVASC) 2.5 MG tablet, TAKE 1 TABLET BY MOUTH DAILY, Disp: 90 tablet, Rfl: 1  •  aspirin 81 MG EC tablet, Take 81 mg by mouth Daily., Disp: , Rfl:   •  atorvastatin (LIPITOR) 20 MG tablet, TAKE 1/2 TABLET BY MOUTH EVERY NIGHT AT BEDTIME, Disp: 45 tablet, Rfl: 1  •  baclofen (LIORESAL) 10 MG tablet, baclofen 10 mg tablet  TAKE 1 TABLET BY MOUTH EVERY DAY, Disp: , Rfl:   •  celecoxib (CeleBREX) 100 MG capsule, Take 100 mg by mouth 2 (Two) Times a Day., Disp: , Rfl:   •  dexlansoprazole (DEXILANT) 60 MG capsule, Take 60 mg by mouth Daily., Disp: , Rfl:   •  diclofenac (FLECTOR) 1.3 % patch patch, Apply 1 patch topically to the appropriate area as directed 2 (Two) Times a Day., Disp: , Rfl:   •  famotidine (PEPCID) 40 MG tablet, Take 1 tablet by mouth 2 (two) times a day., Disp: , Rfl:   •  gabapentin (NEURONTIN) 100 MG capsule, Take 100 mg by mouth Every Night., Disp: , Rfl:   •  Glucosamine-Chondroit-Vit C-Mn (GLUCOSAMINE 1500 COMPLEX PO), Take  by mouth., Disp: , Rfl:   •  HYDROcodone-acetaminophen (NORCO) 7.5-325 MG per tablet, Take 1 tablet by mouth 3 (Three) Times a Day., Disp: , Rfl:   •  Omega-3 Fatty Acids (FISH OIL) 1000 MG capsule capsule, Take 1,200 mg by mouth every night at bedtime., Disp: , Rfl:   •  Probiotic Product (PROBIOTIC-10 PO), Take  by mouth., Disp: , Rfl:   •  vitamin B-12 (CYANOCOBALAMIN) 1000 MCG tablet, Take 1,000 mcg by mouth Daily., Disp: , Rfl:     Review of Systems     A review of systems was performed, and the pertinent positives are noted in the HPI.     /73 (BP Location: Left arm, Patient Position: Sitting, Cuff Size: Large Adult)   Pulse  "68   Temp 98 °F (36.7 °C) (Temporal)   Ht 167.6 cm (66\")   Wt 76.7 kg (169 lb)   SpO2 100%   Breastfeeding No   BMI 27.28 kg/m²       Objective   Physical Exam  Vitals (Vitals are stable) and nursing note reviewed.   Constitutional:       General: She is not in acute distress.     Appearance: She is well-developed.      Comments: Afebrile, overweight, and well groomed.   HENT:      Head: Normocephalic and atraumatic.   Neck:      Thyroid: No thyromegaly.   Cardiovascular:      Rate and Rhythm: Normal rate and regular rhythm.      Heart sounds: Normal heart sounds. No murmur heard.  No friction rub. No gallop.    Pulmonary:      Effort: Pulmonary effort is normal. No respiratory distress.      Breath sounds: Normal breath sounds. No wheezing or rales.   Musculoskeletal:      Cervical back: Neck supple.      Comments: No pedal edema. No calf tenderness, erythema, or warmth.  She is wearing an elastic wrap on her right upper arm. There is no focal tenderness. She has full range of motion of the shoulder.  strength i snormal.   Lymphadenopathy:      Cervical: No cervical adenopathy.   Skin:     General: Skin is warm and dry.   Neurological:      Mental Status: She is alert.   Psychiatric:         Mood and Affect: Mood normal.           Assessment/Plan   Problems Addressed this Visit        Cardiac and Vasculature    Hyperlipidemia - Primary    Relevant Orders    Comprehensive Metabolic Panel    Lipid Panel    Hypertension    Relevant Orders    Comprehensive Metabolic Panel    Lipid Panel      Other Visit Diagnoses     Breast cancer screening by mammogram        Relevant Orders    Mammo Screening Digital Tomosynthesis Bilateral With CAD    Postmenopausal status        Relevant Orders    DEXA Bone Density Axial    Pedal edema        Muscle strain of right upper arm, initial encounter          Diagnoses       Codes Comments    Mixed hyperlipidemia    -  Primary  Well-controlled on Lipitor 20 mg daily. ICD-10-CM: " E78.2  ICD-9-CM: 272.2     Primary hypertension      Well-controlled on amlodipine 2.5 mg. ICD-10-CM: I10  ICD-9-CM: 401.9     Breast cancer screening by mammogram      An order was placed for a screening mammogram. ICD-10-CM: Z12.31  ICD-9-CM: V76.12     Postmenopausal status      An order was placed for a bone density test. She is unsure if she has osteopenia or osteoporosis.  ICD-10-CM: Z78.0  ICD-9-CM: V49.81     Pedal edema      She was counseled on the importance of limiting salt intake and elevating her feet when possible. She was encouraged to increase her water intake and decreased her tobacco use.  ICD-10-CM: R60.0  ICD-9-CM: 782.3     Muscle strain of right upper arm, initial encounter     ICD-10-CM: S46.911A  ICD-9-CM: 840.9          Follow up in 1 year, likely as a Medicare wellness visit.      30 Minutes were spent reviewing the chart, taking her history and doing her exam, counseling patient, ordering labs and test, and completing her chart    Transcribed from ambient dictation for Jennifer Jaffe MD by Alta Llamas.  02/22/22   18:58 EST    Patient verbalized consent to the visit recording.

## 2022-03-25 PROBLEM — M85.89 OSTEOPENIA OF MULTIPLE SITES: Status: ACTIVE | Noted: 2022-03-25

## 2022-03-28 RX ORDER — ATORVASTATIN CALCIUM 20 MG/1
TABLET, FILM COATED ORAL
Qty: 45 TABLET | Refills: 1 | Status: SHIPPED | OUTPATIENT
Start: 2022-03-28 | End: 2022-09-19

## 2022-04-06 RX ORDER — AMLODIPINE BESYLATE 2.5 MG/1
2.5 TABLET ORAL DAILY
Qty: 90 TABLET | Refills: 1 | Status: SHIPPED | OUTPATIENT
Start: 2022-04-06 | End: 2022-07-13

## 2022-07-13 RX ORDER — AMLODIPINE BESYLATE 2.5 MG/1
2.5 TABLET ORAL DAILY
Qty: 90 TABLET | Refills: 1 | Status: SHIPPED | OUTPATIENT
Start: 2022-07-13 | End: 2023-02-05

## 2022-08-16 ENCOUNTER — TELEPHONE (OUTPATIENT)
Dept: FAMILY MEDICINE CLINIC | Facility: CLINIC | Age: 67
End: 2022-08-16

## 2022-08-16 RX ORDER — AMOXICILLIN 875 MG/1
875 TABLET, COATED ORAL 2 TIMES DAILY
Qty: 20 TABLET | Refills: 0 | Status: SHIPPED | OUTPATIENT
Start: 2022-08-16 | End: 2022-08-26

## 2022-08-16 NOTE — TELEPHONE ENCOUNTER
Caller: Yvette Garvin    Relationship: Self    Best call back number:[phone]    What medication are you requesting: ANTIBIOTIC    What are your current symptoms: FLUID IN EARS, CONGESTION    How long have you been experiencing symptoms: 1 MONTH  Have you had these symptoms before:    [x] Yes  [] No    Have you been treated for these symptoms before:   [x] Yes  [] No    If a prescription is needed, what is your preferred pharmacy and phone number:  [facility]  [address] [place] [address]  [phone]    Additional notes: PATIENT IS CALLING IN STATING THAT SHE HAS FLUID IN HER EARS AND IS FULL OF HEAD CONGESTION AND WANTS TO KNOW IF DR SLADE CAN CALL HER IN A ANTIBIOTIC.  SHE DOES NOT WANT TO TAKE DOXYCYCLINE

## 2022-09-19 RX ORDER — ATORVASTATIN CALCIUM 20 MG/1
TABLET, FILM COATED ORAL
Qty: 45 TABLET | Refills: 1 | Status: SHIPPED | OUTPATIENT
Start: 2022-09-19 | End: 2023-03-19

## 2022-09-26 ENCOUNTER — ON CAMPUS - OUTPATIENT (AMBULATORY)
Dept: URBAN - METROPOLITAN AREA HOSPITAL 2 | Facility: HOSPITAL | Age: 67
End: 2022-09-26

## 2022-09-26 VITALS
SYSTOLIC BLOOD PRESSURE: 104 MMHG | DIASTOLIC BLOOD PRESSURE: 62 MMHG | DIASTOLIC BLOOD PRESSURE: 75 MMHG | SYSTOLIC BLOOD PRESSURE: 126 MMHG | RESPIRATION RATE: 15 BRPM | HEART RATE: 67 BPM | SYSTOLIC BLOOD PRESSURE: 130 MMHG | TEMPERATURE: 98 F | RESPIRATION RATE: 12 BRPM | WEIGHT: 162 LBS | SYSTOLIC BLOOD PRESSURE: 103 MMHG | RESPIRATION RATE: 13 BRPM | RESPIRATION RATE: 17 BRPM | HEART RATE: 75 BPM | HEART RATE: 72 BPM | HEART RATE: 69 BPM | RESPIRATION RATE: 18 BRPM | DIASTOLIC BLOOD PRESSURE: 64 MMHG | RESPIRATION RATE: 16 BRPM | DIASTOLIC BLOOD PRESSURE: 76 MMHG | DIASTOLIC BLOOD PRESSURE: 56 MMHG | OXYGEN SATURATION: 99 % | HEART RATE: 65 BPM | SYSTOLIC BLOOD PRESSURE: 132 MMHG | OXYGEN SATURATION: 98 % | DIASTOLIC BLOOD PRESSURE: 90 MMHG | HEIGHT: 66 IN | HEART RATE: 66 BPM | SYSTOLIC BLOOD PRESSURE: 112 MMHG | OXYGEN SATURATION: 100 % | SYSTOLIC BLOOD PRESSURE: 142 MMHG | DIASTOLIC BLOOD PRESSURE: 82 MMHG | SYSTOLIC BLOOD PRESSURE: 116 MMHG | SYSTOLIC BLOOD PRESSURE: 125 MMHG | HEART RATE: 73 BPM | DIASTOLIC BLOOD PRESSURE: 88 MMHG | DIASTOLIC BLOOD PRESSURE: 55 MMHG | OXYGEN SATURATION: 95 % | HEART RATE: 63 BPM

## 2022-09-26 DIAGNOSIS — K21.9 GASTRO-ESOPHAGEAL REFLUX DISEASE WITHOUT ESOPHAGITIS: ICD-10-CM

## 2022-09-26 DIAGNOSIS — Z12.11 ENCOUNTER FOR SCREENING FOR MALIGNANT NEOPLASM OF COLON: ICD-10-CM

## 2022-09-26 DIAGNOSIS — K57.30 DIVERTICULOSIS OF LARGE INTESTINE WITHOUT PERFORATION OR ABS: ICD-10-CM

## 2022-09-26 PROCEDURE — 43235 EGD DIAGNOSTIC BRUSH WASH: CPT | Performed by: INTERNAL MEDICINE

## 2022-09-26 PROCEDURE — G0121 COLON CA SCRN NOT HI RSK IND: HCPCS | Performed by: INTERNAL MEDICINE

## 2022-09-26 NOTE — SERVICEHPINOTES
JESENIA BROWNE  is a  67  female   who presents today for a  EGD-Colonoscopy   for   the indications listed below. The updated Patient Profile was reviewed prior to the procedure, in conjunction with the Physical Exam, including medical conditions, surgical procedures, medications, allergies, family history and social history. See Physical Exam time stamp below for date and time of HPI completion.Pre-operatively, I reviewed the indication(s) for the procedure, the risks of the procedure [including but not limited to: unexpected bleeding possibly requiring hospitalization and/or unplanned repeat procedures, perforation possibly requiring surgical treatment, missed lesions and complications of sedation/MAC (also explained by anesthesia staff)]. I have evaluated the patient for risks associated with the planned anesthesia and the procedure to be performed and find the patient an acceptable candidate for IV sedation.Multiple opportunities were provided for any questions or concerns, and all questions were answered satisfactorily before any anesthesia was administered. We will proceed with the planned procedure.br

## 2022-12-14 ENCOUNTER — OFFICE VISIT (OUTPATIENT)
Dept: FAMILY MEDICINE CLINIC | Facility: CLINIC | Age: 67
End: 2022-12-14

## 2022-12-14 VITALS
DIASTOLIC BLOOD PRESSURE: 80 MMHG | HEART RATE: 75 BPM | HEIGHT: 66 IN | SYSTOLIC BLOOD PRESSURE: 150 MMHG | OXYGEN SATURATION: 99 % | WEIGHT: 168 LBS | BODY MASS INDEX: 27 KG/M2

## 2022-12-14 DIAGNOSIS — F33.0 MAJOR DEPRESSIVE DISORDER, RECURRENT, MILD: Primary | ICD-10-CM

## 2022-12-14 PROCEDURE — 99213 OFFICE O/P EST LOW 20 MIN: CPT | Performed by: NURSE PRACTITIONER

## 2022-12-14 RX ORDER — METHYLPREDNISOLONE 4 MG/1
TABLET ORAL
COMMUNITY
Start: 2022-12-11

## 2022-12-14 RX ORDER — FLUOXETINE HYDROCHLORIDE 20 MG/1
20 CAPSULE ORAL DAILY
Qty: 30 CAPSULE | Refills: 3 | Status: SHIPPED | OUTPATIENT
Start: 2022-12-14 | End: 2023-02-28 | Stop reason: SINTOL

## 2022-12-14 NOTE — PROGRESS NOTES
Subjective   Yvette Garvin is a 67 y.o. female.       HPI   Pt is here today with concern of depression/anxiety.  She has been noticing for a long while that she is more and more irritable, frustrated easily; avoiding enjoyable things; increased worrying.  Denies any SI or HI.  Talked with a friend recently who is a  and they suggested she come in and talk about starting on an antidepressant. She is open to this; says she is tried of feeling the way she has been.      The following portions of the patient's history were reviewed and updated as appropriate: allergies, current medications, past family history, past medical history, past social history, past surgical history and problem list.    Review of Systems   Constitutional: Negative for chills, fatigue and fever.   Respiratory: Negative for cough, shortness of breath and wheezing.    Cardiovascular: Negative for chest pain and palpitations.   Gastrointestinal: Negative for diarrhea, nausea and vomiting.   Neurological: Negative for dizziness, weakness, light-headedness and headache.   Psychiatric/Behavioral: Positive for dysphoric mood, depressed mood and stress. Negative for self-injury and suicidal ideas. The patient is nervous/anxious.        Objective   Physical Exam  Vitals reviewed.   Constitutional:       General: She is not in acute distress.     Appearance: Normal appearance.   Cardiovascular:      Rate and Rhythm: Normal rate and regular rhythm.      Pulses: Normal pulses.      Heart sounds: Normal heart sounds. No murmur heard.  Pulmonary:      Effort: Pulmonary effort is normal. No respiratory distress.      Breath sounds: Normal breath sounds. No wheezing.   Chest:      Chest wall: No tenderness.   Neurological:      General: No focal deficit present.      Mental Status: She is alert and oriented to person, place, and time.   Psychiatric:         Mood and Affect: Mood is anxious.         Behavior: Behavior is cooperative.          Thought Content: Thought content does not include homicidal or suicidal ideation. Thought content does not include homicidal or suicidal plan.           Assessment & Plan   Diagnoses and all orders for this visit:    1. Major depressive disorder, recurrent, mild (HCC) (Primary)  Comments:  Given fluoxetine 20 mg daily.   RTO or message f/u in 4 weeks.   Orders:  -     FLUoxetine (PROzac) 20 MG capsule; Take 1 capsule by mouth Daily.  Dispense: 30 capsule; Refill: 3

## 2022-12-31 NOTE — PATIENT INSTRUCTIONS
Stop the lisinopril  Start the new blood pressure pill   Keep working to lose weight through healthy eating and exercise.       24

## 2023-02-05 RX ORDER — AMLODIPINE BESYLATE 2.5 MG/1
2.5 TABLET ORAL DAILY
Qty: 90 TABLET | Refills: 1 | Status: SHIPPED | OUTPATIENT
Start: 2023-02-05

## 2023-02-28 ENCOUNTER — OFFICE VISIT (OUTPATIENT)
Dept: FAMILY MEDICINE CLINIC | Facility: CLINIC | Age: 68
End: 2023-02-28
Payer: MEDICARE

## 2023-02-28 VITALS
HEIGHT: 66 IN | OXYGEN SATURATION: 99 % | TEMPERATURE: 98 F | BODY MASS INDEX: 26.84 KG/M2 | DIASTOLIC BLOOD PRESSURE: 79 MMHG | HEART RATE: 65 BPM | SYSTOLIC BLOOD PRESSURE: 133 MMHG | WEIGHT: 167 LBS

## 2023-02-28 DIAGNOSIS — E78.2 MIXED HYPERLIPIDEMIA: ICD-10-CM

## 2023-02-28 DIAGNOSIS — I10 PRIMARY HYPERTENSION: ICD-10-CM

## 2023-02-28 DIAGNOSIS — Z00.00 ENCOUNTER FOR ANNUAL WELLNESS EXAM IN MEDICARE PATIENT: Primary | ICD-10-CM

## 2023-02-28 DIAGNOSIS — M85.89 OSTEOPENIA OF MULTIPLE SITES: ICD-10-CM

## 2023-02-28 DIAGNOSIS — F41.1 GAD (GENERALIZED ANXIETY DISORDER): ICD-10-CM

## 2023-02-28 DIAGNOSIS — L98.9 SKIN LESION: ICD-10-CM

## 2023-02-28 DIAGNOSIS — R35.0 URINARY FREQUENCY: ICD-10-CM

## 2023-02-28 LAB
BILIRUB BLD-MCNC: ABNORMAL MG/DL
CLARITY, POC: CLEAR
COLOR UR: YELLOW
EXPIRATION DATE: ABNORMAL
GLUCOSE UR STRIP-MCNC: NEGATIVE MG/DL
KETONES UR QL: ABNORMAL
LEUKOCYTE EST, POC: ABNORMAL
Lab: ABNORMAL
NITRITE UR-MCNC: NEGATIVE MG/ML
PH UR: 6 [PH] (ref 5–8)
PROT UR STRIP-MCNC: ABNORMAL MG/DL
RBC # UR STRIP: ABNORMAL /UL
SP GR UR: 1.02 (ref 1–1.03)
UROBILINOGEN UR QL: NORMAL

## 2023-02-28 PROCEDURE — 99214 OFFICE O/P EST MOD 30 MIN: CPT | Performed by: FAMILY MEDICINE

## 2023-02-28 PROCEDURE — G0402 INITIAL PREVENTIVE EXAM: HCPCS | Performed by: FAMILY MEDICINE

## 2023-02-28 PROCEDURE — 1125F AMNT PAIN NOTED PAIN PRSNT: CPT | Performed by: FAMILY MEDICINE

## 2023-02-28 PROCEDURE — 1170F FXNL STATUS ASSESSED: CPT | Performed by: FAMILY MEDICINE

## 2023-02-28 PROCEDURE — 1159F MED LIST DOCD IN RCRD: CPT | Performed by: FAMILY MEDICINE

## 2023-02-28 PROCEDURE — 81003 URINALYSIS AUTO W/O SCOPE: CPT | Performed by: FAMILY MEDICINE

## 2023-02-28 RX ORDER — SULFAMETHOXAZOLE AND TRIMETHOPRIM 800; 160 MG/1; MG/1
1 TABLET ORAL 2 TIMES DAILY
Qty: 14 TABLET | Refills: 0 | Status: SHIPPED | OUTPATIENT
Start: 2023-02-28

## 2023-02-28 NOTE — PATIENT INSTRUCTIONS
Stop smoking  Avoid/limit caffeine  Keep working to lose weight through healthy eating and exercise.   Stress reduction    Advance Directive  Advance directives are legal documents that allow you to make decisions about your health care and medical treatment in case you become unable to communicate for yourself. Advance directives let your wishes be known to family, friends, and health care providers.  Discussing and writing advance directives should happen over time rather than all at once. Advance directives can be changed and updated at any time. There are different types of advance directives, such as:  Medical power of .  Living will.  Do not resuscitate (DNR) order or do not attempt resuscitation (DNAR) order.  Health care proxy and medical power of   A health care proxy is also called a health care agent. This person is appointed to make medical decisions for you when you are unable to make decisions for yourself. Generally, people ask a trusted friend or family member to act as their proxy and represent their preferences. Make sure you have an agreement with your trusted person to act as your proxy. A proxy may have to make a medical decision on your behalf if your wishes are not known.  A medical power of , also called a durable power of  for health care, is a legal document that names your health care proxy. Depending on the laws in your state, the document may need to be:  Signed.  Notarized.  Dated.  Copied.  Witnessed.  Incorporated into your medical record.  You may also want to appoint a trusted person to manage your money in the event you are unable to do so. This is called a durable power of  for finances. It is a separate legal document from the durable power of  for health care. You may choose your health care proxy or someone different to act as your agent in money matters.  If you do not appoint a proxy, or there is a concern that the proxy is not  acting in your best interest, a court may appoint a guardian to act on your behalf.  Living will  A living will is a set of instructions that state your wishes about medical care when you cannot express them yourself. Health care providers should keep a copy of your living will in your medical record. You may want to give a copy to family members or friends. To alert caregivers in case of an emergency, you can place a card in your wallet to let them know that you have a living will and where they can find it. A living will is used if you become:  Terminally ill.  Disabled.  Unable to communicate or make decisions.  The following decisions should be included in your living will:  To use or not to use life support equipment, such as dialysis machines and breathing machines (ventilators).  Whether you want a DNR or DNAR order. This tells health care providers not to use cardiopulmonary resuscitation (CPR) if breathing or heartbeat stops.  To use or not to use tube feeding.  To be given or not to be given food and fluids.  Whether you want comfort (palliative) care when the goal becomes comfort rather than a cure.  Whether you want to donate your organs and tissues.  A living will does not give instructions for distributing your money and property if you should pass away.  DNR or DNAR  A DNR or DNAR order is a request not to have CPR in the event that your heart stops beating or you stop breathing. If a DNR or DNAR order has not been made and shared, a health care provider will try to help any patient whose heart has stopped or who has stopped breathing. If you plan to have surgery, talk with your health care provider about how your DNR or DNAR order will be followed if problems occur.  What if I do not have an advance directive?  Some states assign family decision makers to act on your behalf if you do not have an advance directive. Each state has its own laws about advance directives. You may want to check with your  health care provider, , or state representative about the laws in your state.  Summary  Advance directives are legal documents that allow you to make decisions about your health care and medical treatment in case you become unable to communicate for yourself.  The process of discussing and writing advance directives should happen over time. You can change and update advance directives at any time.  Advance directives may include a medical power of , a living will, and a DNR or DNAR order.  This information is not intended to replace advice given to you by your health care provider. Make sure you discuss any questions you have with your health care provider.  Document Revised: 09/21/2021 Document Reviewed: 09/21/2021  [employer]vier Patient Education © 2022 [employer]

## 2023-02-28 NOTE — PROGRESS NOTES
The ABCs of the Annual Wellness Visit  [place] to Medicare Visit    Subjective     Yvette Garvin is a 67 y.o. female who presents for a  Welcome to Medicare Visit.    The following portions of the patient's history were reviewed and   updated as appropriate: allergies, current medications, past family history, past medical history, past social history, past surgical history and problem list.     Compared to one year ago, the patient feels her physical   health is the same.    Compared to one year ago, the patient feels her mental   health is worse.    Recent Hospitalizations:  She was not admitted to the hospital during the last year.       Current Medical Providers:  Patient Care Team:  Jennifer Jaffe MD as PCP - General  Jennifer Jaffe MD as PCP - Family Medicine    Outpatient Medications Prior to Visit   Medication Sig Dispense Refill   • amLODIPine (NORVASC) 2.5 MG tablet TAKE 1 TABLET BY MOUTH DAILY 90 tablet 1   • aspirin 81 MG EC tablet Take 1 tablet by mouth Daily.     • atorvastatin (LIPITOR) 20 MG tablet TAKE 1/2 TABLET BY MOUTH EVERY NIGHT AT BEDTIME 45 tablet 1   • baclofen (LIORESAL) 10 MG tablet baclofen 10 mg tablet   TAKE 1 TABLET BY MOUTH EVERY DAY     • celecoxib (CeleBREX) 100 MG capsule Take 1 capsule by mouth 2 (Two) Times a Day.     • dexlansoprazole (DEXILANT) 60 MG capsule Take 1 capsule by mouth Daily.     • famotidine (PEPCID) 40 MG tablet Take 1 tablet by mouth 2 (two) times a day.     • gabapentin (NEURONTIN) 100 MG capsule Take 1 capsule by mouth Every Night.     • Glucosamine-Chondroit-Vit C-Mn (GLUCOSAMINE 1500 COMPLEX PO) Take  by mouth.     • HYDROcodone-acetaminophen (NORCO) 7.5-325 MG per tablet Take 1 tablet by mouth 3 (Three) Times a Day.     • Omega-3 Fatty Acids (FISH OIL) 1000 MG capsule capsule Take 1,200 mg by mouth every night at bedtime.     • Probiotic Product (PROBIOTIC-10 PO) Take  by mouth.     • vitamin B-12 (CYANOCOBALAMIN) 1000 MCG tablet  Take 1 tablet by mouth Daily.     • methylPREDNISolone (MEDROL) 4 MG dose pack      • diclofenac (FLECTOR) 1.3 % patch patch Apply 1 patch topically to the appropriate area as directed 2 (Two) Times a Day.     • FLUoxetine (PROzac) 20 MG capsule Take 1 capsule by mouth Daily. 30 capsule 3     No facility-administered medications prior to visit.       Opioid medication/s are on active medication list.  and I have evaluated her active treatment plan and pain score trends (see table).  Vitals:    02/28/23 1455   PainSc:   8   PainLoc: Hip     I have reviewed the chart for potential of high risk medication and harmful drug interactions in the elderly.            Aspirin is on active medication list. Aspirin use is indicated based on review of current medical condition/s. Pros and cons of this therapy have been discussed today. Benefits of this medication outweigh potential harm.  Patient has been encouraged to continue taking this medication.  .      Patient Active Problem List   Diagnosis   • Chest pain   • Degeneration of lumbar intervertebral disc   • Gastroesophageal reflux disease   • Mixed hyperlipidemia   • Primary hypertension   • Sinusitis, acute   • Spasm   • Piriformis syndrome of right side   • Piriformis syndrome of left side   • Piriformis syndrome of both sides   • Pain in the coccyx   • Myositis   • Spondylosis without myelopathy   • Lumbosacral spondylosis   • Lumbar radiculopathy   • Long-term current use of opiate analgesic   • Ischial bursitis   • Insomnia   • Encounter for long-term (current) use of insulin (HCC)   • Disorder of sacrum   • Osteopenia of multiple sites   • Encounter for annual wellness exam in Medicare patient   • REUBEN (generalized anxiety disorder)     Advance Care Planning  Advance Directive is not on file.  ACP discussion was held with the patient during this visit. Patient does not have an advance directive, information provided.       Objective   Vitals:    02/28/23 1455   BP:  "133/79   BP Location: Left arm   Patient Position: Sitting   Cuff Size: Adult   Pulse: 65   Temp: 98 °F (36.7 °C)   TempSrc: Temporal   SpO2: 99%   Weight: 75.8 kg (167 lb)   Height: 167.6 cm (66\")   PainSc:   8   PainLoc: Hip     Estimated body mass index is 26.95 kg/m² as calculated from the following:    Height as of this encounter: 167.6 cm (66\").    Weight as of this encounter: 75.8 kg (167 lb).    BMI is >= 25 and <30. (Overweight) The following options were offered after discussion;: nutrition counseling/recommendations      Does the patient have evidence of cognitive impairment?   No   MMSE done 30/30         Procedures       HEALTH RISK ASSESSMENT    Smoking Status:  Social History     Tobacco Use   Smoking Status Every Day   • Packs/day: 0.50   • Years: 30.00   • Pack years: 15.00   • Types: Cigarettes   Smokeless Tobacco Never     Alcohol Consumption:  Social History     Substance and Sexual Activity   Alcohol Use Yes    Comment: occ       Fall Risk Screen:    [facility] Fall Risk Assessment has not been completed.    Depression Screen:   PHQ-2/PHQ-9 Depression Screening 12/14/2022   Little Interest or Pleasure in Doing Things 1-->several days   Feeling Down, Depressed or Hopeless 0-->not at all   PHQ-9: Brief Depression Severity Measure Score 1       Health Habits and Functional and Cognitive Screening:  Functional & Cognitive Status 2/28/2023   Do you have difficulty preparing food and eating? No   Do you have difficulty bathing yourself, getting dressed or grooming yourself? No   Do you have difficulty using the toilet? No   Do you have difficulty moving around from place to place? No   Do you have trouble with steps or getting out of a bed or a chair? No   Current Diet Limited Junk Food   Dental Exam Not up to date   Eye Exam Up to date        Eye Exam Comment -   Exercise (times per week) 7 times per week        Exercise Frequency Comment walking dog   Current Exercises Include Walking        Exercise " Comment -   Do you need help using the phone?  No   Are you deaf or do you have serious difficulty hearing?  No   Do you need help with transportation? No   Do you need help shopping? No   Do you need help preparing meals?  No   Do you need help with housework?  No   Do you need help with laundry? No   Do you need help taking your medications? No   Do you need help managing money? No   Do you ever drive or ride in a car without wearing a seat belt? No   Have you felt unusual stress, anger or loneliness in the last month? Yes   Who do you live with? Other   If you need help, do you have trouble finding someone available to you? No   Have you been bothered in the last four weeks by sexual problems? No   Do you have difficulty concentrating, remembering or making decisions? Yes       Visual Acuity:    Vision Screening    Right eye Left eye Both eyes   Without correction 20/25 20/25 20/25   With correction          Age-appropriate Screening Schedule:  Refer to the list below for future screening recommendations based on patient's age, sex and/or medical conditions. Orders for these recommended tests are listed in the plan section. The patient has been provided with a written plan.    Health Maintenance   Topic Date Due   • Pneumococcal Vaccine 65+ (2 - PCV) 08/26/2021   • LIPID PANEL  02/22/2023   • TDAP/TD VACCINES (1 - Tdap) 12/14/2023 (Originally 5/14/1974)   • PAP SMEAR  11/25/2023   • ANNUAL WELLNESS VISIT  02/28/2024   • MAMMOGRAM  03/08/2024   • DXA SCAN  03/08/2024   • COLORECTAL CANCER SCREENING  09/26/2032   • HEPATITIS C SCREENING  Completed   • COVID-19 Vaccine  Completed   • INFLUENZA VACCINE  Completed   • ZOSTER VACCINE  Discontinued        CMS Preventative Services Quick Reference  Risk Factors Identified During Encounter    Tobacco Use/Dependance Risk (use dotphrase .tobaccocessation for documentation)  The above risks/problems have been discussed with the patient.  Pertinent information has been shared  "with the patient in the After Visit Summary.    Follow Up:   Initial Medicare Visit in one year    An After Visit Summary and PPPS were made available to the patient.      Additional E&M Note during same encounter follows:  Patient has multiple medical problems which are significant and separately identifiable that require additional work above and beyond the Medicare Wellness Visit.      Chief Complaint  Medicare Wellness-subsequent, Hip Pain (Also has osteo penia. Discuss option), and Urinary Frequency    Subjective        HPI  Yvette Garvin is also being seen today for bp and chol  Could not tolerate the prozac  Stopped it after 5 days as she did not like the way she felt  Uses TENS unit for her hip  Cannot tolerate calcium  Still smoking  Drink lots of caffeine    Review of Systems   Constitutional: Negative.    Respiratory: Negative.    Cardiovascular: Negative.    Musculoskeletal: Positive for arthralgias.   Psychiatric/Behavioral: Negative for self-injury and suicidal ideas. The patient is nervous/anxious.        Objective   Vital Signs:  /79 (BP Location: Left arm, Patient Position: Sitting, Cuff Size: Adult)   Pulse 65   Temp 98 °F (36.7 °C) (Temporal)   Ht 167.6 cm (66\")   Wt 75.8 kg (167 lb)   SpO2 99%   BMI 26.95 kg/m²     Physical Exam  Vitals and nursing note reviewed.   Constitutional:       General: She is not in acute distress.     Appearance: She is well-developed.   HENT:      Head: Normocephalic and atraumatic.   Neck:      Thyroid: No thyromegaly.   Cardiovascular:      Rate and Rhythm: Normal rate and regular rhythm.      Heart sounds: Normal heart sounds. No murmur heard.    No friction rub. No gallop.   Pulmonary:      Effort: Pulmonary effort is normal. No respiratory distress.      Breath sounds: Normal breath sounds. No wheezing or rales.   Musculoskeletal:      Cervical back: Neck supple.      Right lower leg: No edema.      Left lower leg: No edema.   Lymphadenopathy:      " Cervical: No cervical adenopathy.   Skin:     General: Skin is warm and dry.   Neurological:      Mental Status: She is alert and oriented to person, place, and time.   Psychiatric:         Mood and Affect: Mood is anxious.        Brief Urine Lab Results  (Last result in the past 365 days)      Color   Clarity   Blood   Leuk Est   Nitrite   Protein   CREAT   Urine HCG        02/28/23 1529 Yellow   Clear   Trace  Comment: trace-intact   Small (1+)   Negative   30 mg/dL                               Assessment and Plan   Diagnoses and all orders for this visit:    1. Encounter for annual wellness exam in Medicare patient (Primary)    2. Mixed hyperlipidemia    3. Primary hypertension    4. Urinary frequency  -     POCT urinalysis dipstick, automated    5. Skin lesion  -     Ambulatory Referral to Dermatology    6. Osteopenia of multiple sites    7. REUBEN (generalized anxiety disorder)    Other orders  -     sulfamethoxazole-trimethoprim (Bactrim DS) 800-160 MG per tablet; Take 1 tablet by mouth 2 (Two) Times a Day.  Dispense: 14 tablet; Refill: 0    Assessment and plan is hypertension she will continue her current medications of amlodipine next is mixed hyperlipidemia she will continue atorvastatin  She is due a CMP and a lipid next history of urinary frequency she appears to have a urinary tract infection I put her on Bactrim DS twice daily for 7 days.  There is insufficient amount to be able to get a culture next is the skin lesion on her right forearm.  She had been referred to 1 location but there was a data breach so she wants to try going to a different location so I put in a referral to dermatology next is osteopenia I have given her a handout on osteopenia and also recommended that she stop smoking and decrease her caffeine consumption.  She has been unable to tolerate calcium but I have strongly encouraged her to stay active and to take the vitamin D next is a generalized anxiety disorder: She did not tolerate  Prozac.  She asked for a as needed medication but I explained to her that most of those have addictive potential and she prefers not to do that  She asked if she were to get off her pain medications if I would be willing to write her Celebrex and her gabapentin and I told her that I would  She is due a Prevnar 20 but unfortunately we are out of the vaccine at this time so it will be given to her to different visit       Follow Up   Return in about 1 year (around 2/28/2024) for Recheck, Medicare Wellness.  Patient was given instructions and counseling regarding her condition or for health maintenance advice. Please see specific information pulled into the AVS if appropriate.

## 2023-03-15 ENCOUNTER — OFFICE (AMBULATORY)
Dept: URBAN - METROPOLITAN AREA CLINIC 64 | Facility: CLINIC | Age: 68
End: 2023-03-15

## 2023-03-15 VITALS
HEART RATE: 56 BPM | HEIGHT: 66 IN | SYSTOLIC BLOOD PRESSURE: 127 MMHG | WEIGHT: 176 LBS | DIASTOLIC BLOOD PRESSURE: 79 MMHG

## 2023-03-15 DIAGNOSIS — F11.90 OPIOID USE, UNSPECIFIED, UNCOMPLICATED: ICD-10-CM

## 2023-03-15 DIAGNOSIS — K21.9 GASTRO-ESOPHAGEAL REFLUX DISEASE WITHOUT ESOPHAGITIS: ICD-10-CM

## 2023-03-15 PROCEDURE — 99213 OFFICE O/P EST LOW 20 MIN: CPT | Performed by: INTERNAL MEDICINE

## 2023-03-19 RX ORDER — ATORVASTATIN CALCIUM 20 MG/1
TABLET, FILM COATED ORAL
Qty: 45 TABLET | Refills: 1 | Status: SHIPPED | OUTPATIENT
Start: 2023-03-19

## 2023-03-20 ENCOUNTER — TELEPHONE (OUTPATIENT)
Dept: FAMILY MEDICINE CLINIC | Facility: CLINIC | Age: 68
End: 2023-03-20
Payer: MEDICARE

## 2023-03-20 DIAGNOSIS — Z12.31 BREAST CANCER SCREENING BY MAMMOGRAM: Primary | ICD-10-CM

## 2023-03-20 NOTE — TELEPHONE ENCOUNTER
Caller: Yvette Garvin    Relationship to patient: Self    Best call back number: [phone]    Patient is needing:  PATIENT IS NEEDING A 3D MAMMOGRAM   PATIENT WOULD LIKE A RETURN CALL WHEN REFERRAL IS MADE      [facility]  [facility] IN [place], [place]

## 2023-06-16 ENCOUNTER — TELEPHONE (OUTPATIENT)
Dept: FAMILY MEDICINE CLINIC | Facility: CLINIC | Age: 68
End: 2023-06-16
Payer: MEDICARE

## 2023-06-16 RX ORDER — BENZONATATE 100 MG/1
100 CAPSULE ORAL 3 TIMES DAILY PRN
Qty: 30 CAPSULE | Refills: 0 | Status: SHIPPED | OUTPATIENT
Start: 2023-06-16

## 2023-06-16 RX ORDER — CEPHALEXIN 500 MG/1
500 CAPSULE ORAL 2 TIMES DAILY
Qty: 20 CAPSULE | Refills: 0 | Status: SHIPPED | OUTPATIENT
Start: 2023-06-16

## 2023-06-16 NOTE — TELEPHONE ENCOUNTER
Caller: Yvette Garvin    Relationship: Self    Best call back number: [phone]    What medication are you requesting: ANTIBIOTICS    What are your current symptoms: COUGH, EAR ACHE, CHILLS, FEVER, GREEN MUCAS, PUFFY FACE    How long have you been experiencing symptoms: 10 DAYS    Have you had these symptoms before:    [x] Yes  [] No    Have you been treated for these symptoms before:   [x] Yes  [] No    If a prescription is needed, what is your preferred pharmacy and phone number: [id number] [place] [id number] AT SEC OF [place] [id number] & [facility] LINE  - [phone]  - [phone] FX     Additional notes:    SAID SHE DOES WELL WITH KEFLEX.    PLEASE CALL TO CONFIRM OR DENY WITH PATIENT.

## 2023-11-20 RX ORDER — AMLODIPINE BESYLATE 2.5 MG/1
2.5 TABLET ORAL DAILY
Qty: 90 TABLET | Refills: 1 | Status: SHIPPED | OUTPATIENT
Start: 2023-11-20

## 2023-12-31 RX ORDER — ATORVASTATIN CALCIUM 20 MG/1
TABLET, FILM COATED ORAL
Qty: 45 TABLET | Refills: 0 | Status: SHIPPED | OUTPATIENT
Start: 2023-12-31

## 2025-06-05 ENCOUNTER — OFFICE VISIT (OUTPATIENT)
Age: 70
End: 2025-06-05
Payer: MEDICARE

## 2025-06-05 VITALS — BODY MASS INDEX: 26.68 KG/M2 | RESPIRATION RATE: 20 BRPM | WEIGHT: 166 LBS | HEIGHT: 66 IN

## 2025-06-05 DIAGNOSIS — M79.672 LEFT FOOT PAIN: Primary | ICD-10-CM

## 2025-06-05 DIAGNOSIS — B35.1 ONYCHOMYCOSIS: ICD-10-CM

## 2025-06-05 DIAGNOSIS — L60.3 ONYCHODYSTROPHY: ICD-10-CM

## 2025-06-06 NOTE — PROGRESS NOTES
06/05/2025  Foot and Ankle Surgery - New Patient   Provider: Dr. Kunal Miller DPM  Location: [facility]    Subjective:  Yvette Garvin is a 70 y.o. female.     Chief Complaint   Patient presents with    Left Foot - Ingrown Toenail, Follow-up    Right Foot - Ingrown Toenail, Follow-up    Follow-up     PCP: Elo Salmon MD  Last PCP Visit: 5/6/25         History of Present Illness  The patient presents for evaluation of her toenails.    She reports a history of three back surgeries, during which she noticed a flattening of her toe. Upon resuming work, she experienced trauma to the toe, resulting in significant loosening and splitting of the nail. She has been managing the condition by applying a Band-Aid to the affected area. Prior to the consultation, she attempted to soften the nails by soaking them. She also mentions a previous instance where the nail was removed and the nail bed cauterized, but the nail subsequently regrew.       No Known Allergies    Past Medical History:   Diagnosis Date    Arthritis     Back pain     lower back pain    GERD (gastroesophageal reflux disease)     Hip pain     Hyperlipidemia     Left sided chest pain     cardiac R/O       Past Surgical History:   Procedure Laterality Date    APPENDECTOMY      BACK SURGERY  07/2024    CARDIAC ABLATION      for slow heart rate    CHOLECYSTECTOMY      COLONOSCOPY      ENDOSCOPY      HIP SURGERY Left     SI joint     HYSTERECTOMY         Family History   Problem Relation Age of Onset    Cancer Mother     Heart disease Father     Cancer Sister     Breast cancer Sister     Cancer Brother        Social History     Socioeconomic History    Marital status:    Tobacco Use    Smoking status: Every Day     Current packs/day: 0.50     Average packs/day: 0.5 packs/day for 30.0 years (15.0 ttl pk-yrs)     Types: Cigarettes     Passive exposure: Current    Smokeless tobacco: Never   Vaping Use    Vaping status: Never Used  "  Substance and Sexual Activity    Alcohol use: Yes     Comment: occ    Drug use: Yes     Types: Marijuana     Comment: CBD    Sexual activity: Defer        Current Outpatient Medications on File Prior to Visit   Medication Sig Dispense Refill    acetaminophen (Tylenol) 325 MG tablet       amLODIPine (NORVASC) 2.5 MG tablet TAKE 1 TABLET BY MOUTH DAILY 90 tablet 1    aspirin 81 MG EC tablet Take 1 tablet by mouth Daily.      atorvastatin (LIPITOR) 20 MG tablet TAKE 1/2 TABLET BY MOUTH EVERY NIGHT AT BEDTIME 45 tablet 0    cefpodoxime (VANTIN) 200 MG tablet Take 1 tablet by mouth Every 12 (Twelve) Hours for 10 days. 20 tablet 0    celecoxib (CeleBREX) 100 MG capsule Take 1 capsule by mouth 2 (Two) Times a Day.      famotidine (PEPCID) 40 MG tablet Take 1 tablet by mouth 2 (two) times a day.      Glucosamine-Chondroit-Vit C-Mn (GLUCOSAMINE 1500 COMPLEX PO) Take  by mouth.      HYDROcodone-acetaminophen (NORCO) 7.5-325 MG per tablet Take 1 tablet by mouth 3 (Three) Times a Day.      Omega-3 Fatty Acids (FISH OIL) 1000 MG capsule capsule Take 1,200 mg by mouth every night at bedtime.      pantoprazole (PROTONIX) 40 MG EC tablet Take 1 tablet by mouth 2 (Two) Times a Day.      pregabalin (LYRICA) 25 MG capsule Take 1 capsule by mouth.      Probiotic Product (PROBIOTIC-10 PO) Take  by mouth.      tiZANidine (ZANAFLEX) 4 MG tablet Take 1 tablet by mouth 1 (One) Time.      triamcinolone (KENALOG) 0.1 % cream Apply  topically to the appropriate area as directed.      vitamin B-12 (CYANOCOBALAMIN) 1000 MCG tablet Take 1 tablet by mouth Daily.      naloxone (NARCAN) 4 MG/0.1ML nasal spray CALL 911. SPR CONTENTS OF ONE SPRAYER (0.1ML) INTO ONE NOSTRIL. REPEAT IN 2-3 MIN IF SYMPTOMS OF OPIOID EMERGENCY PERSIST, ALTERNATE NOSTRILS      TiZANidine (ZANAFLEX) 2 MG capsule        No current facility-administered medications on file prior to visit.         Objective   Resp 20   Ht 167.6 cm (66\")   Wt 75.3 kg (166 lb)   BMI 26.79 " kg/m²     Foot/Ankle Exam  Physical Exam  General:   Appearance: appears stated age and healthy    Orientation: AAOx3    Affect: appropriate    Gait: unimpaired       VASCULAR       Right Foot Vascularity   Normal vascular exam    Dorsalis pedis:  2+  Posterior tibial:  2+  Skin Temperature: warm    Edema Grading:  None  CFT:  < 3 seconds  Pedal Hair Growth:  Present  Varicosities: none        Left Foot Vascularity   Normal vascular exam    Dorsalis pedis:  2+  Posterior tibial:  2+  Skin Temperature: warm    Edema Grading:  None  CFT:  < 3 seconds  Pedal Hair Growth:  Present  Varicosities: none        NEUROLOGIC      Right Foot Neurologic   Light touch sensation:  Normal  Hot/Cold sensation: normal    Achilles reflex:  2+      Left Foot Neurologic   Light touch sensation:  Normal  Hot/cold sensation: normal    Achilles reflex:  2+      MUSCULOSKELETAL       Right Foot Musculoskeletal   Ecchymosis:  None  Tenderness: none    Arch:  Normal      Left Foot Musculoskeletal   Ecchymosis:  None  Tenderness: none    Arch:  Normal      MUSCLE STRENGTH      Right Foot Muscle Strength   Normal strength    Foot dorsiflexion:  5  Foot plantar flexion:  5  Foot inversion:  5  Foot eversion:  5      Left Foot Muscle Strength   Normal strength    Foot dorsiflexion:  5  Foot plantar flexion:  5  Foot inversion:  5  Foot eversion:  5      DERMATOLOGIC      Right Foot Dermatologic   Skin: skin intact    Nails: dystrophic nails        Left Foot Dermatologic   Skin: skin intact    Nails: dystrophic nails        Right Foot Additional Comments Mildly incurvated borders involving the great toes.  No erythema, edema, or further concerns.     6/5/25: Discomfort with palpation involving the left great toe.  Significant loosening involving the toenail from the nailbed.  Mild periungual erythema.  No drainage or signs of infection.  Similar findings involving the right great toe with less discomfort today.  The rest of the exam is relatively  unchanged and stable.    Results         Assessment & Plan   Diagnoses and all orders for this visit:    1. Left foot pain (Primary)    2. Onychodystrophy    3. Onychomycosis       Assessment & Plan    The patient's toenails are not attached, consistent with the condition of her nails during the last visit in 2021. The detachment could be due to repetitive stress, trauma, medications, or footwear. The nail plates have  from the nail bed, leading to scarring of the nail bed and root, which may result in abnormal nail growth. A comprehensive discussion was held regarding the potential causes of her condition, including repetitive stress, trauma, medications, and footwear. The treatment plan involves the removal of the left toenail today, followed by trimming of the right toenail. The patient was informed that the healing process would take approximately 6 to 8 weeks. Post-procedure care instructions were provided, including the application of Neosporin and a Band-Aid.Reviewed proper basic stretching and manual therapy exercises along with appropriate shoes and activity.  Discussed proper use and/or avoidance of OTC anti-inflammatories.  Patient is to call with any additional issues or concerns.  Greater than 30 minutes was spent before, during, and after evaluation for patient care excluding procedure.    The encounter note is created with the use of AI technology.  I do apologize if there are typos and/or confusion within the note.  Please feel free to contact me or my office with any questions or concerns.    Total Nail Avulsion with Chemical Matrixectomy: Left hallux    Consent and time out was performed before proceeding with the procedure.  Left hallux was cleansed with alcohol and the digit was blocked in a ring block fashion utilizing 5 mL of 1% lidocaine plain.  A digital tourniquet was applied to the digit.  The nail was lifted from the nail bed and nail margin with a [place].  The offending nail  margins were grasped with a hemostat and removed. The nail borders were explored with a curette to ensure adequate removal.  Matrixectomy was performed utilizing three 30 second applications of 89% phenol on a micro-tip applicator.  The area was then rinsed with alcohol to dilute the phenol. The nail fold and exposed nail bed were flushed with normal saline.  Antibiotic ointment followed by sterile compressive dressings were then applied.  The digital tourniquot was removed.  No excessive bleeding or complications were evident.  The patient tolerated procedure and local anesthetic well.     Follow-up in 2 to 3 months for reevaluation         Patient or patient representative verbalized consent for the use of Ambient Listening during the visit with  EDELMIRA Miller DPM for chart documentation. 6/6/2025  07:30 EDT    EDELMIRA Miller DPM